# Patient Record
Sex: FEMALE | Race: WHITE
[De-identification: names, ages, dates, MRNs, and addresses within clinical notes are randomized per-mention and may not be internally consistent; named-entity substitution may affect disease eponyms.]

---

## 2017-02-10 ENCOUNTER — HOSPITAL ENCOUNTER (OUTPATIENT)
Dept: HOSPITAL 47 - LABPAT | Age: 82
Discharge: HOME | End: 2017-02-10
Payer: MEDICARE

## 2017-02-10 DIAGNOSIS — Z01.812: Primary | ICD-10-CM

## 2017-02-10 DIAGNOSIS — Z01.810: ICD-10-CM

## 2017-02-10 LAB
ANION GAP SERPL CALC-SCNC: 10 MMOL/L
CELLS COUNTED: 100
CH: 31.9
CHCM: 33
CHLORIDE SERPL-SCNC: 100 MMOL/L (ref 98–107)
CO2 SERPL-SCNC: 30 MMOL/L (ref 22–30)
EKG: (no result)
ERYTHROCYTE [DISTWIDTH] IN BLOOD BY AUTOMATED COUNT: 4.23 M/UL (ref 3.8–5.4)
ERYTHROCYTE [DISTWIDTH] IN BLOOD: 13.8 % (ref 11.5–15.5)
HCT VFR BLD AUTO: 41 % (ref 34–46)
HDW: 2.43
HGB BLD-MCNC: 13.4 GM/DL (ref 11.4–16)
MCH RBC QN AUTO: 31.6 PG (ref 25–35)
MCHC RBC AUTO-ENTMCNC: 32.6 G/DL (ref 31–37)
MCV RBC AUTO: 96.9 FL (ref 80–100)
POTASSIUM SERPL-SCNC: 5.1 MMOL/L (ref 3.5–5.1)
SODIUM SERPL-SCNC: 140 MMOL/L (ref 137–145)
WBC # BLD AUTO: 5.9 K/UL (ref 3.8–10.6)
WBC (PEROX): 9.65

## 2017-02-10 PROCEDURE — 85025 COMPLETE CBC W/AUTO DIFF WBC: CPT

## 2017-02-10 PROCEDURE — 86900 BLOOD TYPING SEROLOGIC ABO: CPT

## 2017-02-10 PROCEDURE — 93005 ELECTROCARDIOGRAM TRACING: CPT

## 2017-02-10 PROCEDURE — 80051 ELECTROLYTE PANEL: CPT

## 2017-02-10 PROCEDURE — 86850 RBC ANTIBODY SCREEN: CPT

## 2017-02-10 PROCEDURE — 86901 BLOOD TYPING SEROLOGIC RH(D): CPT

## 2017-02-17 ENCOUNTER — HOSPITAL ENCOUNTER (INPATIENT)
Dept: HOSPITAL 47 - 2ORWHC | Age: 82
LOS: 5 days | Discharge: HOME HEALTH | DRG: 330 | End: 2017-02-22
Payer: MEDICARE

## 2017-02-17 VITALS — BODY MASS INDEX: 24.7 KG/M2

## 2017-02-17 DIAGNOSIS — Z87.891: ICD-10-CM

## 2017-02-17 DIAGNOSIS — N73.6: ICD-10-CM

## 2017-02-17 DIAGNOSIS — M15.9: ICD-10-CM

## 2017-02-17 DIAGNOSIS — N32.1: ICD-10-CM

## 2017-02-17 DIAGNOSIS — E88.09: ICD-10-CM

## 2017-02-17 DIAGNOSIS — J90: ICD-10-CM

## 2017-02-17 DIAGNOSIS — K57.20: Primary | ICD-10-CM

## 2017-02-17 DIAGNOSIS — Z87.440: ICD-10-CM

## 2017-02-17 DIAGNOSIS — B96.20: ICD-10-CM

## 2017-02-17 DIAGNOSIS — B37.49: ICD-10-CM

## 2017-02-17 DIAGNOSIS — I10: ICD-10-CM

## 2017-02-17 DIAGNOSIS — M19.91: ICD-10-CM

## 2017-02-17 DIAGNOSIS — R32: ICD-10-CM

## 2017-02-17 PROCEDURE — 87086 URINE CULTURE/COLONY COUNT: CPT

## 2017-02-17 PROCEDURE — 86850 RBC ANTIBODY SCREEN: CPT

## 2017-02-17 PROCEDURE — 80053 COMPREHEN METABOLIC PANEL: CPT

## 2017-02-17 PROCEDURE — 87070 CULTURE OTHR SPECIMN AEROBIC: CPT

## 2017-02-17 PROCEDURE — 80048 BASIC METABOLIC PNL TOTAL CA: CPT

## 2017-02-17 PROCEDURE — 87077 CULTURE AEROBIC IDENTIFY: CPT

## 2017-02-17 PROCEDURE — 86901 BLOOD TYPING SEROLOGIC RH(D): CPT

## 2017-02-17 PROCEDURE — 84100 ASSAY OF PHOSPHORUS: CPT

## 2017-02-17 PROCEDURE — 85025 COMPLETE CBC W/AUTO DIFF WBC: CPT

## 2017-02-17 PROCEDURE — 86900 BLOOD TYPING SEROLOGIC ABO: CPT

## 2017-02-17 PROCEDURE — 84132 ASSAY OF SERUM POTASSIUM: CPT

## 2017-02-17 PROCEDURE — 87186 SC STD MICRODIL/AGAR DIL: CPT

## 2017-02-17 PROCEDURE — 87040 BLOOD CULTURE FOR BACTERIA: CPT

## 2017-02-17 PROCEDURE — 0DBN4ZZ EXCISION OF SIGMOID COLON, PERCUTANEOUS ENDOSCOPIC APPROACH: ICD-10-PCS

## 2017-02-17 PROCEDURE — 87205 SMEAR GRAM STAIN: CPT

## 2017-02-17 PROCEDURE — 88307 TISSUE EXAM BY PATHOLOGIST: CPT

## 2017-02-17 PROCEDURE — 71020: CPT

## 2017-02-17 PROCEDURE — 87075 CULTR BACTERIA EXCEPT BLOOD: CPT

## 2017-02-17 PROCEDURE — 83735 ASSAY OF MAGNESIUM: CPT

## 2017-02-17 PROCEDURE — 81001 URINALYSIS AUTO W/SCOPE: CPT

## 2017-02-17 PROCEDURE — 0T788DZ DILATION OF BILATERAL URETERS WITH INTRALUMINAL DEVICE, VIA NATURAL OR ARTIFICIAL OPENING ENDOSCOPIC: ICD-10-PCS

## 2017-02-17 RX ADMIN — HYDROMORPHONE HYDROCHLORIDE PRN MG: 1 INJECTION, SOLUTION INTRAMUSCULAR; INTRAVENOUS; SUBCUTANEOUS at 12:43

## 2017-02-17 RX ADMIN — ONDANSETRON ONE MG: 2 INJECTION INTRAMUSCULAR; INTRAVENOUS at 12:22

## 2017-02-17 RX ADMIN — HYDROMORPHONE HYDROCHLORIDE PRN MG: 1 INJECTION, SOLUTION INTRAMUSCULAR; INTRAVENOUS; SUBCUTANEOUS at 12:54

## 2017-02-17 RX ADMIN — DEXTROSE MONOHYDRATE, SODIUM CHLORIDE, AND POTASSIUM CHLORIDE SCH MLS/HR: 50; 4.5; 1.49 INJECTION, SOLUTION INTRAVENOUS at 14:06

## 2017-02-17 RX ADMIN — METRONIDAZOLE SCH MLS/HR: 500 INJECTION, SOLUTION INTRAVENOUS at 16:12

## 2017-02-17 RX ADMIN — HYDROMORPHONE HYDROCHLORIDE PRN MG: 1 INJECTION, SOLUTION INTRAMUSCULAR; INTRAVENOUS; SUBCUTANEOUS at 12:22

## 2017-02-17 RX ADMIN — HEPARIN SODIUM SCH: 5000 INJECTION, SOLUTION INTRAVENOUS; SUBCUTANEOUS at 15:04

## 2017-02-17 RX ADMIN — LABETALOL HCL SCH MG: 100 TABLET, FILM COATED ORAL at 21:01

## 2017-02-17 RX ADMIN — ONDANSETRON ONE MG: 2 INJECTION INTRAMUSCULAR; INTRAVENOUS at 06:14

## 2017-02-17 RX ADMIN — DEXTROSE MONOHYDRATE, SODIUM CHLORIDE, AND POTASSIUM CHLORIDE SCH: 50; 4.5; 1.49 INJECTION, SOLUTION INTRAVENOUS at 20:00

## 2017-02-17 RX ADMIN — HYDROMORPHONE HYDROCHLORIDE PRN MG: 1 INJECTION, SOLUTION INTRAMUSCULAR; INTRAVENOUS; SUBCUTANEOUS at 12:32

## 2017-02-17 RX ADMIN — POTASSIUM CHLORIDE SCH MLS: 14.9 INJECTION, SOLUTION INTRAVENOUS at 06:10

## 2017-02-17 NOTE — P.PCN
Date of Procedure: 02/17/17


Preoperative Diagnosis: 


Colovesical fistula, complex perforated diverticulitis


Postoperative Diagnosis: 


Saline


Procedure(s) Performed: 


Laparoscopic mobilization of splenic flexure, laparoscopic takedown and repair 

of colovesical fistula, laparoscopic sigmoid colectomy with primary colorectal 

anastomosis, placement of round #19 drain, intraoperative colonoscopy for 

flexible sigmoidoscopy


Anesthesia: GETA, local


Surgeon: Kalli Roberson


Estimated Blood Loss (ml): 75


Pathology: other (Sigmoid colon, donuts)


Condition: stable


Disposition: floor


Operative Findings: 


Colovesical fistula along the left lateral bladder dome resected with stapler, 

retained abscess from previous sigmoid diverticulitis with perforation, sigmoid 

stricture from recurrent diverticulitis resected, high splenic flexure 

mobilized laparoscopically, 29 mm ILS for colorectal anastomosis, negative leak 

test, REMEDIOS placed anterior to colorectal anastomosis via right lower quadrant

## 2017-02-17 NOTE — OP
DATE OF SERVICE:  



SURGEON:  EMI UREÑA MD

PREOPERATIVE DIAGNOSIS:  Colovesical fistula. 

POSTOPERATIVE DIAGNOSIS: Colovesical fistula. 

OPERATION:       Cystoscopy with placement of bilateral lighted 

ureteral stents. 

ANESTHESIA:  General anesthesia. 





INDICATIONS:  The patient is an 84-year-old female with a history of 

recurrent urinary tract infections who has begun passing particular 

matter and air in her urine.  The patient has been discovered to have a 
colovesical 

fistula related to diverticulitis. Dr. Roberson plans resection of 

the diseased portion of her colon and placement of bilateral ureteral 

stents was requested to aid in intraoperative localization of the 

ureters.  



PROCEDURE: The patient was taken the operating suite where adequate 

general anesthesia via orotracheal intubation was instituted. The 

patient was placed in the dorsal lithotomy position with her legs 

suspended from padded Dagoberto stirrups. Pneumatic compression stockings 

were applied to the lower legs. The genitalia was prepped with 

Betadine solution, and draped in a sterile fashion.  



The external genitalia and urethral meatus were normal. The 22 Uzbek 

cystoscope sheath with 30 degrees lens was passed through the urethra 

and into the bladder. The bladder contained cloudy urine with 

particulate matter. This was irrigated out using sterile water. The 

bladder was examined. Both ureteral orifices were of normal location 

and configuration and effluxed clear urine. There was some slight 

edema on the posterior bladder wall which would most likely correspond 

to the fistula site. The remainder of the bladder was free of tumor, 

foreign body and diverticulum.  



A 5 Uzbek lighted fibrotic ureteral stent was then passed through the 

right ureteral orifice and advanced up to the region of the renal 

pelvis. The same size stent was then passed through the left ureteral 

orifice and up to the region of the left renal pelvis. Both stents 

passed freely. The cystoscope was removed leaving the stents in place. 

 16 Uzbek Aguilar catheter was inserted and was left to gravity 

drainage. The stents were then secured to the Aguilar catheter.  



The patient tolerated procedure well. The remainder of the procedure 

will be performed by Dr. Roberson and the summary will be in her 

operative note.  It is anticipated that the lighted stents will be 

removed at the completion of the procedure. 



[facility]

## 2017-02-17 NOTE — P.PN
Subjective


Principal diagnosis: 





Colovesical fistula





The patient is postop day 0 status post takedown of colovesical fistula with a 

laparoscopic sigmoid resection and bilateral ureteral stent placement.  She is 

doing well.  Pain is controlled.  She is tolerating water.  No reports of 

flatus.





Objective





- Vital Signs


Vital signs: 


 Vital Signs











Temp  97.9 F   02/17/17 16:24


 


Pulse  99   02/17/17 16:11


 


Resp  12   02/17/17 16:11


 


BP  102/67   02/17/17 16:11


 


Pulse Ox  99   02/17/17 16:11








 Intake & Output











 02/17/17 02/17/17 02/18/17





 06:59 18:59 06:59


 


Intake Total 200 2800 


 


Output Total  520 


 


Balance 200 2280 


 


Intake:   


 


   2800 


 


Output:   


 


  Drainage  45 


 


    Right Abdomen  45 


 


  Urine  400 


 


  Estimated Blood Loss  75 


 


Other:   


 


  Voiding Method  Indwelling Catheter 














- Exam





GENERAL:  Well developed and in no acute distress. Pleasant.


HEENT:  No sclera icterus. Extraocular movements grossly intact.  Moist buccal 

mucosa. Head is atraumatic, normocephalic. Hears conversational speech. No 

nasal drainage.


NECK:  Supple without lymphadenopathy. No JV distention.


CHEST:  Non-labored respirations and equal bilateral excursions. 


CARDIOVASCULAR:  Regular rate and rhythm.  Palpable 2+ radial pulses.


ABDOMEN:  Soft, nondistended.  Dressing clean dry and intact.  REMEDIOS 

serosanguineous.


GENITOURINARY: Urine tea colored.  Moderate reduction of hematuria.


MUSCULOSKELETAL:  No clubbing, cyanosis or edema.


NEUROLOGIC:  No focal or lateralizing signs. 


PSYCH:  Appropriate affect.  Alert and oriented to person, place and time.








- Labs


CBC & Chem 7: 


 02/17/17 06:10


Labs: 


 Microbiology - Last 24 Hours (Table)











 02/17/17 10:50 Body Fluid Culture - Preliminary





 Aspirate 


 


 02/17/17 10:50 Anaerobic Culture - Preliminary





 Aspirate 














Assessment and Plan


(1) Colovesical fistula


Status: Chronic   





(2) Perforation of sigmoid colon due to diverticulitis


Status: Chronic   





(3) Status post colectomy


Status: Acute   





(4) S/P bladder repair


Status: Acute   


Plan: 





1.  Continue Aguilar catheter for at least 72 hours for recent bladder repair.


2.  IV fluid hydration.


3.  DVT prophylaxis.


4.  Ambulation 4 times daily.


5.  Continue enhance colon Recovery with exception of continue Aguilar catheter.


6.  Disposition from hospital pending flatus.

## 2017-02-17 NOTE — P.GSHP
History of Present Illness


H&P Date: 02/17/17











CHIEF COMPLAINT:


Colovesical fistula and diverticulitis. 





HISTORY OF PRESENT ILLNESS:


Angie Moer is an 84 years-old female who comes in with a history of air 

within the bladder secondary to diverticulitis and a colovesical fistula. In 

fact, she has already had an evaluation with another surgeon at [facility]. Secondary to her symptoms, she is now seeking care locally. She 

denies any abdominal surgeries, except her tubal ligation and having 10 

children through vaginal delivery. There are no reports of cardiac issues or 

chest pain. She has been recently treated for a recurrent UTI. She now presents 

further evaluation and management. 








PAST MEDICAL HISTORY: 


Please see list





PAST SURGICAL HISTORY: 


Please see list





MEDICATIONS: 


Please see list





ALLERGIES: Denies. 





SOCIAL HISTORY: No illicit drug use or recent tobacco use





FAMILY HISTORY: Pertinent for gallbladder disease 





REVIEW OF ORGAN SYSTEMS: 


GI: History of recurrent diverticulitis. Colonoscopy was done within the last 2 

months, negative for malignancy.


CONSTITUTIONAL: No fevers or chills.


HEENT: Denies any trouble with vision, hearing or nosebleeds. No difficulty 

swallowing. 


LYMPHATIC: The patient denies any lumps and bumps around the neck. 


ENDOCRINE: Denies any thyroid disorders. Denies any blood sugar glucose 

intolerance.


RESPIRATORY: Denies pneumonia. Denies any troubles with breathing or dyspnea on 

exertion. 


CARDIOVASCULAR: Denies any chest pain, palpitations, or recent heart attacks. 


GENITOURINARY: Denies any blood in urine or increased urinary frequency. 


MUSCULOSKELETAL: Denies any back pain, stiffness or joint arthritis. 


NEUROLOGIC: Denies any numbness or tingling along the distal extremities. No 

seizure disorders or headaches.


PSYCHIATRIC: Denies any depression or suicidal ideation.


HEMATOLOGIC: Denies any abnormal bleeding or bruising. 


BREASTS: Denies any breast lumps, pain or nipple discharge.





PHYSICAL EXAM: 


VITAL SIGNS: 


Afebrile vital signs stable





GENERAL: Well developed and in no acute distress. Pleasant.


HEENT: No sclera icterus. Extraocular movements grossly intact. Moist buccal 

mucosa. Head is atraumatic, normocephalic. Hears conversational speech. No 

nasal drainage.


NECK: Supple without lymphadenopathy. No JV distention.


CHEST: Non-labored respirations and equal bilateral excursions. 


CARDIOVASCULAR: Regular rate and rhythm. Palpable 2+ radial pulses.


ABDOMEN: Soft. Non-tender. Nondistended. No large scars. Along the umbilicus, 

umbilical hernia reducible. 


MUSCULOSKELETAL: No clubbing, cyanosis or edema.


NEUROLOGIC: No focal or lateralizing signs. 


PSYCH: Appropriate affect. Alert and oriented to person, place and time.





STUDIES: 


CT of the abdomen/pelvis was reviewed in detail and demonstrated air within the 

bladder including previous history of diverticulitis with an abscess from her 11 /20/16 film. The imaging studies were reviewed along with her at bedside. 





ASSESSMENT:


1. Diverticulitis. 


2. Colovesical fistula. 


3. History of recurrent UTI. 


4. Hypertension. 





PLAN:


1. I have gone over surgical options including minimally invasive techniques of 

laparoscopic and robotic including open surgery. The possibility of ostomy 

creation was also reviewed. Proceed with laparoscopic sigmoid colectomy.


2. More commonly, the risks of surgical site infection were described as this 

does involve the colon. This is also a colovesical fistula with increased risk 

for ureter complications and bladder complications were described. 


3. Referral to the urologist for stent placement including bladder repair was 

also reviewed in detail. 


4. She will need enhanced colon recovery program protocol.


5. Inpatient hospitalization on average of anywhere between 3-7 days pending 

minimally evasive versus open technique. 


6. DVT prophylaxis. 


7. Antibiotics prophylaxis. 








Past Medical History


Past Medical History: Hypertension, Osteoarthritis (OA)


Additional Past Medical History / Comment(s): varicose veins, diverticulitis,


History of Any Multi-Drug Resistant Organisms: None Reported


Past Surgical History: Tubal Ligation


Additional Past Surgical History / Comment(s): colonscopy


Past Anesthesia/Blood Transfusion Reactions: No Reported Reaction


Past Psychological History: No Psychological Hx Reported


Smoking Status: Former smoker


Past Alcohol Use History: Occasional


Additional Past Alcohol Use History / Comment(s): quit smoking age 30's, smoked 

on and off since age 14


Past Drug Use History: None Reported





- Past Family History


  ** Mother


Family Medical History: Cancer





  ** Daughter(s)


Family Medical History: Cancer





Medications and Allergies


 Home Medications











 Medication  Instructions  Recorded  Confirmed  Type


 


Aspirin EC [Ecotrin Low Dose] 81 mg PO DAILY 11/17/16 02/17/17 History


 


Labetalol [Trandate] 100 mg PO HS 11/17/16 02/17/17 History


 


Multivitamins, Thera [Multivitamin] 1 tab PO DAILY 11/17/16 02/17/17 History











 Allergies











Allergy/AdvReac Type Severity Reaction Status Date / Time


 


No Known Allergies Allergy   Verified 02/17/17 06:03

## 2017-02-18 LAB
ALP SERPL-CCNC: 69 U/L (ref 38–126)
ALT SERPL-CCNC: 25 U/L (ref 9–52)
ANION GAP SERPL CALC-SCNC: 9 MMOL/L
AST SERPL-CCNC: 25 U/L (ref 14–36)
BASOPHILS # BLD AUTO: 0 K/UL (ref 0–0.2)
BASOPHILS NFR BLD AUTO: 0 %
BUN SERPL-SCNC: 11 MG/DL (ref 7–17)
CALCIUM SPEC-MCNC: 8.4 MG/DL (ref 8.4–10.2)
CH: 31.9
CHCM: 32.7
CHLORIDE SERPL-SCNC: 99 MMOL/L (ref 98–107)
CO2 SERPL-SCNC: 27 MMOL/L (ref 22–30)
EOSINOPHIL # BLD AUTO: 0 K/UL (ref 0–0.7)
EOSINOPHIL NFR BLD AUTO: 0 %
ERYTHROCYTE [DISTWIDTH] IN BLOOD BY AUTOMATED COUNT: 3.58 M/UL (ref 3.8–5.4)
ERYTHROCYTE [DISTWIDTH] IN BLOOD: 13.9 % (ref 11.5–15.5)
GLUCOSE SERPL-MCNC: 168 MG/DL (ref 74–99)
HCT VFR BLD AUTO: 35.2 % (ref 34–46)
HDW: 2.42
HGB BLD-MCNC: 11.3 GM/DL (ref 11.4–16)
LUC NFR BLD AUTO: 1 %
LYMPHOCYTES # SPEC AUTO: 1 K/UL (ref 1–4.8)
LYMPHOCYTES NFR SPEC AUTO: 7 %
MAGNESIUM SPEC-SCNC: 1.7 MG/DL (ref 1.6–2.3)
MCH RBC QN AUTO: 31.6 PG (ref 25–35)
MCHC RBC AUTO-ENTMCNC: 32.2 G/DL (ref 31–37)
MCV RBC AUTO: 98.2 FL (ref 80–100)
MONOCYTES # BLD AUTO: 0.7 K/UL (ref 0–1)
MONOCYTES NFR BLD AUTO: 5 %
NEUTROPHILS # BLD AUTO: 12 K/UL (ref 1.3–7.7)
NEUTROPHILS NFR BLD AUTO: 87 %
NON-AFRICAN AMERICAN GFR(MDRD): >60
PHOSPHATE SERPL-MCNC: 3.5 MG/DL (ref 2.5–4.5)
POTASSIUM SERPL-SCNC: 5.2 MMOL/L (ref 3.5–5.1)
PROT SERPL-MCNC: 5.8 G/DL (ref 6.3–8.2)
SODIUM SERPL-SCNC: 135 MMOL/L (ref 137–145)
WBC # BLD AUTO: 0.11 10*3/UL
WBC # BLD AUTO: 13.9 K/UL (ref 3.8–10.6)
WBC (PEROX): 14.4

## 2017-02-18 RX ADMIN — HEPARIN SODIUM SCH UNIT: 5000 INJECTION, SOLUTION INTRAVENOUS; SUBCUTANEOUS at 07:50

## 2017-02-18 RX ADMIN — LABETALOL HCL SCH MG: 100 TABLET, FILM COATED ORAL at 20:25

## 2017-02-18 RX ADMIN — HEPARIN SODIUM SCH UNIT: 5000 INJECTION, SOLUTION INTRAVENOUS; SUBCUTANEOUS at 23:09

## 2017-02-18 RX ADMIN — ALVIMOPAN SCH MG: 12 CAPSULE ORAL at 20:25

## 2017-02-18 RX ADMIN — CEFAZOLIN SCH MLS/HR: 330 INJECTION, POWDER, FOR SOLUTION INTRAMUSCULAR; INTRAVENOUS at 11:59

## 2017-02-18 RX ADMIN — HYDROMORPHONE HYDROCHLORIDE PRN MG: 1 INJECTION, SOLUTION INTRAMUSCULAR; INTRAVENOUS; SUBCUTANEOUS at 11:56

## 2017-02-18 RX ADMIN — DEXTROSE MONOHYDRATE, SODIUM CHLORIDE, AND POTASSIUM CHLORIDE SCH MLS/HR: 50; 4.5; 1.49 INJECTION, SOLUTION INTRAVENOUS at 04:00

## 2017-02-18 RX ADMIN — HEPARIN SODIUM SCH UNIT: 5000 INJECTION, SOLUTION INTRAVENOUS; SUBCUTANEOUS at 00:05

## 2017-02-18 RX ADMIN — METRONIDAZOLE SCH MLS/HR: 500 INJECTION, SOLUTION INTRAVENOUS at 16:11

## 2017-02-18 RX ADMIN — METRONIDAZOLE SCH MLS/HR: 500 INJECTION, SOLUTION INTRAVENOUS at 00:05

## 2017-02-18 RX ADMIN — HEPARIN SODIUM SCH UNIT: 5000 INJECTION, SOLUTION INTRAVENOUS; SUBCUTANEOUS at 16:11

## 2017-02-18 RX ADMIN — THERA TABS SCH EACH: TAB at 12:02

## 2017-02-18 RX ADMIN — DEXTROSE MONOHYDRATE, SODIUM CHLORIDE, AND POTASSIUM CHLORIDE SCH MLS/HR: 50; 4.5; 1.49 INJECTION, SOLUTION INTRAVENOUS at 10:31

## 2017-02-18 RX ADMIN — HYDROMORPHONE HYDROCHLORIDE PRN MG: 1 INJECTION, SOLUTION INTRAMUSCULAR; INTRAVENOUS; SUBCUTANEOUS at 20:25

## 2017-02-18 RX ADMIN — HYDROMORPHONE HYDROCHLORIDE PRN MG: 1 INJECTION, SOLUTION INTRAMUSCULAR; INTRAVENOUS; SUBCUTANEOUS at 10:38

## 2017-02-18 RX ADMIN — POTASSIUM CHLORIDE SCH: 14.9 INJECTION, SOLUTION INTRAVENOUS at 06:35

## 2017-02-18 RX ADMIN — ALVIMOPAN SCH MG: 12 CAPSULE ORAL at 07:50

## 2017-02-18 RX ADMIN — ASPIRIN 81 MG CHEWABLE TABLET SCH MG: 81 TABLET CHEWABLE at 07:50

## 2017-02-18 NOTE — P.PN
Subjective


Principal diagnosis: 





Colovesical fistula





The patient is postop day 1 status post takedown of colovesical fistula with a 

laparoscopic sigmoid resection and bilateral ureteral stent placement.  She is 

ambulating with assistance. No reports of nausea or vomiting. No passage of 

flatus. She is urinating well.





Objective





- Vital Signs


Vital signs: 


 Vital Signs











Temp  98.4 F   02/18/17 07:00


 


Pulse  81   02/18/17 07:00


 


Resp  16   02/18/17 07:00


 


BP  114/77   02/18/17 07:00


 


Pulse Ox  99   02/18/17 07:00








 Intake & Output











 02/17/17 02/18/17 02/18/17





 18:59 06:59 18:59


 


Intake Total 2800 1675 


 


Output Total 520 530 


 


Balance 2280 1145 


 


Intake:   


 


  IV 2800  


 


  Intake, IV Titration  1675 





  Amount   


 


    D5-0.45% NaCl with KCl  1375 





    20Meq/l 1,000 ml @ 125   





    mls/hr IV .Q8H CLOTILDE Rx#:   





    [id number]   


 


    ceFAZolin 2 gm In Sodium  200 





    Chloride 0.9% 100 ml @   





    100 mls/hr IVPB Q8HR CLOTILDE   





    Rx#:[id number]   


 


    metroNIDAZOLE-NS   100 





    mg In Saline 1 100ml.bag   





    @ 100 mls/hr IVPB Q8HR   





    CLOTILDE Rx#:[id number]   


 


Output:   


 


  Drainage 45 30 


 


    Right Abdomen 45 30 


 


  Urine 400 500 


 


  Estimated Blood Loss 75  


 


Other:   


 


  Voiding Method Indwelling Catheter Indwelling Catheter Indwelling Catheter


 


  # Bowel Movements  0 














- Exam





GENERAL:  Well developed and in no acute distress. Pleasant.


HEENT:  No sclera icterus. Extraocular movements grossly intact.  Moist buccal 

mucosa. Head is atraumatic, normocephalic. Hears conversational speech. No 

nasal drainage.


NECK:  Supple without lymphadenopathy. No JV distention.


CHEST:  Non-labored respirations and equal bilateral excursions. 


CARDIOVASCULAR:  Regular rate and rhythm.  Palpable 2+ radial pulses.


ABDOMEN:  Soft, mildly distended.  Dressing clean dry and intact.  REMEDIOS 

serosanguineous. Minimal lynnette-incisional tenderness.


GENITOURINARY: Urine tea colored. No gross hematuria.


MUSCULOSKELETAL:  No clubbing, cyanosis or edema.


NEUROLOGIC:  No focal or lateralizing signs. 


PSYCH:  Appropriate affect.  Alert and oriented to person, place and time.








- Labs


CBC & Chem 7: 


 02/18/17 07:27





 02/18/17 07:27


Labs: 


 Abnormal Lab Results - Last 24 Hours (Table)











  02/18/17 02/18/17 Range/Units





  07:27 07:27 


 


WBC  13.9 H   (3.8-10.6)  k/uL


 


RBC  3.58 L   (3.80-5.40)  m/uL


 


Hgb  11.3 L   (11.4-16.0)  gm/dL


 


Neutrophils #  12.0 H   (1.3-7.7)  k/uL


 


Sodium   135 L  (137-145)  mmol/L


 


Potassium   5.2 H  (3.5-5.1)  mmol/L


 


Glucose   168 H  (74-99)  mg/dL


 


Total Protein   5.8 L  (6.3-8.2)  g/dL


 


Albumin   3.0 L  (3.5-5.0)  g/dL








 Microbiology - Last 24 Hours (Table)











 02/17/17 10:50 Gram Stain - Preliminary





 Aspirate Body Fluid Culture - Preliminary





    Gram Neg Bacilli


 


 02/17/17 10:50 Anaerobic Culture - Preliminary





 Aspirate 














Assessment and Plan


(1) Colovesical fistula


Status: Chronic   





(2) Perforation of sigmoid colon due to diverticulitis


Status: Chronic   





(3) Status post colectomy


Status: Acute   





(4) S/P bladder repair


Status: Acute   


Plan: 





1.  Continue blanco catheter.


2.  IV fluids adjusted to normal saline.


3.  Home healthcare evaluation.


4.  Continue liquid diet.


5.  Continue hospitalization.


6.  Continue IV antibiotics for contaminated case.

## 2017-02-19 LAB
ALP SERPL-CCNC: 79 U/L (ref 38–126)
ALT SERPL-CCNC: 31 U/L (ref 9–52)
ANION GAP SERPL CALC-SCNC: 8 MMOL/L
AST SERPL-CCNC: 23 U/L (ref 14–36)
BASOPHILS # BLD AUTO: 0.2 K/UL (ref 0–0.2)
BASOPHILS NFR BLD AUTO: 1 %
BUN SERPL-SCNC: 12 MG/DL (ref 7–17)
CALCIUM SPEC-MCNC: 8.3 MG/DL (ref 8.4–10.2)
CH: 31.8
CHCM: 32.3
CHLORIDE SERPL-SCNC: 98 MMOL/L (ref 98–107)
CO2 SERPL-SCNC: 28 MMOL/L (ref 22–30)
EOSINOPHIL # BLD AUTO: 0 K/UL (ref 0–0.7)
EOSINOPHIL NFR BLD AUTO: 0 %
ERYTHROCYTE [DISTWIDTH] IN BLOOD BY AUTOMATED COUNT: 3.5 M/UL (ref 3.8–5.4)
ERYTHROCYTE [DISTWIDTH] IN BLOOD: 14 % (ref 11.5–15.5)
GLUCOSE SERPL-MCNC: 141 MG/DL (ref 74–99)
HCT VFR BLD AUTO: 34.6 % (ref 34–46)
HDW: 2.45
HGB BLD-MCNC: 10.8 GM/DL (ref 11.4–16)
LUC NFR BLD AUTO: 1 %
LYMPHOCYTES # SPEC AUTO: 1.2 K/UL (ref 1–4.8)
LYMPHOCYTES NFR SPEC AUTO: 7 %
MAGNESIUM SPEC-SCNC: 1.7 MG/DL (ref 1.6–2.3)
MCH RBC QN AUTO: 30.8 PG (ref 25–35)
MCHC RBC AUTO-ENTMCNC: 31.2 G/DL (ref 31–37)
MCV RBC AUTO: 98.9 FL (ref 80–100)
MONOCYTES # BLD AUTO: 0.6 K/UL (ref 0–1)
MONOCYTES NFR BLD AUTO: 4 %
NEUTROPHILS # BLD AUTO: 14.4 K/UL (ref 1.3–7.7)
NEUTROPHILS NFR BLD AUTO: 88 %
NON-AFRICAN AMERICAN GFR(MDRD): >60
PARTICLE COUNT: (no result)
PH UR: 5.5 [PH] (ref 5–8)
POTASSIUM SERPL-SCNC: 4.7 MMOL/L (ref 3.5–5.1)
PROT SERPL-MCNC: 5.7 G/DL (ref 6.3–8.2)
PROT UR QL: (no result)
RBC UR QL: >182 /HPF (ref 0–5)
SODIUM SERPL-SCNC: 134 MMOL/L (ref 137–145)
SP GR UR: 1.02 (ref 1–1.03)
SQUAMOUS UR QL AUTO: 2 /HPF (ref 0–4)
UA BILLING (MACRO VS. MICRO): (no result)
UROBILINOGEN UR QL STRIP: <2 MG/DL (ref ?–2)
WBC # BLD AUTO: 0.14 10*3/UL
WBC # BLD AUTO: 16.4 K/UL (ref 3.8–10.6)
WBC #/AREA URNS HPF: 105 /HPF (ref 0–5)
WBC (PEROX): 17.35

## 2017-02-19 RX ADMIN — ALVIMOPAN SCH MG: 12 CAPSULE ORAL at 07:56

## 2017-02-19 RX ADMIN — CEFAZOLIN SCH MLS/HR: 330 INJECTION, POWDER, FOR SOLUTION INTRAMUSCULAR; INTRAVENOUS at 20:01

## 2017-02-19 RX ADMIN — HEPARIN SODIUM SCH UNIT: 5000 INJECTION, SOLUTION INTRAVENOUS; SUBCUTANEOUS at 07:55

## 2017-02-19 RX ADMIN — CEFAZOLIN SCH: 330 INJECTION, POWDER, FOR SOLUTION INTRAMUSCULAR; INTRAVENOUS at 03:11

## 2017-02-19 RX ADMIN — METRONIDAZOLE SCH MLS/HR: 500 INJECTION, SOLUTION INTRAVENOUS at 08:55

## 2017-02-19 RX ADMIN — ALVIMOPAN SCH MG: 12 CAPSULE ORAL at 20:01

## 2017-02-19 RX ADMIN — METRONIDAZOLE SCH MLS/HR: 500 INJECTION, SOLUTION INTRAVENOUS at 00:14

## 2017-02-19 RX ADMIN — CEFAZOLIN SCH MLS/HR: 330 INJECTION, POWDER, FOR SOLUTION INTRAMUSCULAR; INTRAVENOUS at 09:00

## 2017-02-19 RX ADMIN — AMPICILLIN SODIUM AND SULBACTAM SODIUM SCH MLS/HR: 2; 1 INJECTION, POWDER, FOR SOLUTION INTRAMUSCULAR; INTRAVENOUS at 23:47

## 2017-02-19 RX ADMIN — HEPARIN SODIUM SCH UNIT: 5000 INJECTION, SOLUTION INTRAVENOUS; SUBCUTANEOUS at 16:05

## 2017-02-19 RX ADMIN — AMPICILLIN SODIUM AND SULBACTAM SODIUM SCH MLS/HR: 2; 1 INJECTION, POWDER, FOR SOLUTION INTRAMUSCULAR; INTRAVENOUS at 17:19

## 2017-02-19 RX ADMIN — LABETALOL HCL SCH MG: 100 TABLET, FILM COATED ORAL at 20:01

## 2017-02-19 RX ADMIN — THERA TABS SCH: TAB at 11:48

## 2017-02-19 RX ADMIN — ASPIRIN 81 MG CHEWABLE TABLET SCH MG: 81 TABLET CHEWABLE at 07:56

## 2017-02-19 RX ADMIN — METRONIDAZOLE SCH MLS/HR: 500 INJECTION, SOLUTION INTRAVENOUS at 16:04

## 2017-02-19 RX ADMIN — METOCLOPRAMIDE SCH MG: 5 INJECTION, SOLUTION INTRAMUSCULAR; INTRAVENOUS at 15:12

## 2017-02-19 RX ADMIN — METOCLOPRAMIDE SCH MG: 5 INJECTION, SOLUTION INTRAMUSCULAR; INTRAVENOUS at 23:47

## 2017-02-19 RX ADMIN — HYDROMORPHONE HYDROCHLORIDE PRN MG: 1 INJECTION, SOLUTION INTRAMUSCULAR; INTRAVENOUS; SUBCUTANEOUS at 02:21

## 2017-02-19 RX ADMIN — METOCLOPRAMIDE SCH: 5 INJECTION, SOLUTION INTRAMUSCULAR; INTRAVENOUS at 17:32

## 2017-02-19 NOTE — XR
EXAMINATION TYPE: XR chest 2V

 

DATE OF EXAM: 2/19/2017 9:28 AM

 

COMPARISON: CT angiogram of the chest 22nd of February 2016

 

HISTORY: Atelectasis

 

TECHNIQUE:  Frontal and lateral views of the chest are obtained.

 

FINDINGS:  The heart remains enlarged. Pleural effusion on the right has resolved. There is some ques
tionable minimal posterior blunting of the costophrenic angles. Emphysematous changes are suspected, 
lung volumes are prominent. No pneumothorax. Patient is rotated. Pulmonary vascularity and yoni withi
n normal limits. There is lateralization of the left hemidiaphragm.

 

IMPRESSION:  Improvement in pleural effusion. Cardiomegaly. Follow-up PA and lateral chest x-ray as i
ndicated. Rotated exam.

## 2017-02-19 NOTE — CONS
DATE OF CONSULTATION:  02/19/2017



Reason for consultation is abnormal abscess, E. coli. 



HISTORY OF PRESENT ILLNESS: The patient is an 84-year-old  

female with past medical history significant for diverticulitis with 

complication of  colovesical fistula.  The patient did have a 

CAT scan, which did show air within the bladder secondary to her 

diverticulitis.  Subsequently, the patient has been evaluated and the 

patient has been electively admitted to the hospital for  repair 

of the same.  The patient was taken to the OR on 2/17/2017. 

 The patient is status post takedown of the 

colovesical fistula with ascorbic sigmoid resection and bilateral 

ureteral stent placement. She was noticed to pus that has been 

drained. Cultures were obtained which is now showing E. coli.  Patient 

has been treated with cefazolin and Flagyl. ID was consulted today for 

further recommendation regarding antibiotic therapy.  Patient denies 

significant abdominal pain.  At this point, her pain is currently 

controlled with pain medication.  Patient denies having any nausea or 

vomiting. She has been tolerating a clear liquid diet, has been 

passing some gas but did not have any bowel movement. Patient denies 

having any chest pain or shortness of breath or cough.  Patient did 

mention taking a course of oral antibiotic for 2 weeks prior to being 

admitted to the hospital; however, the patient is not sure about the 

name of the antibiotics.   



REVIEW OF SYSTEMS: 

CONSTITUTIONAL: Positive for weakness. No high-grade fever. 

EYES: No complaint. 

ENT: No complaint. 

RESPIRATORY: No complaint. 

CARDIOVASCULAR: No complaint. 

GENITOURINARY: As per HPI.

GASTROINTESTINAL:  As per HPI.  

MUSCULOSKELETAL: No complaint. 

INTEGUMENTARY: No complaint. 

PSYCHOLOGICAL:  No complaint.

ENDOCRINE: No complaint. 

HEMATOLOGICAL:  No complaint. 



PAST MEDICAL HISTORY: Hypertension, osteoarthritis, diverticulitis, 

varicose veins and UTI.   



PAST SURGICAL HISTORY: Tubal ligation and colonoscopy. 



SOCIAL HISTORY: Remote history of smoking; quit back in the 30s. No 

drinking or drug use.  



FAMILY HISTORY: Mother and daughter with a cancer. 



ALLERGIES: No known drug allergies. 



Medications currently include the patient is on aspirin, Cepacol 

lozenges, cefazolin 2 gm, Dilaudid, Trandate, Flagyl, Theragran. 

 



On examination, blood pressure is 133/72 with a pulse of 95, 

temperature of 98 and no fever recorded with this hospital stay. She 

is 94% on 2 L nasal cannula.  

General description is an elderly female, lying in bed in no distress. 

No tachypnea or accessory muscles for respiration use.  

HEENT examination, slight pallor, no scleral icterus. Oral mucosa is 

dry.  

NECK: Trachea central. There is no thyromegaly. 

LUNGS: Unlabored breathing. Clear to auscultation anteriorly. No 

wheeze or crackle.  

HEART: S1, S2. Regular rate and rhythm. 

ABDOMEN: Soft, mildly distended. No guarding and no rigidity REMEDIOS 

drainage with serosaginous  secretion.  

EXTREMITIES: No edema of the feet. 

Examination of Skin : no rash and  no mass palpable. 

NEUROLOGICAL: Patient is awake, alert, oriented x3. Mood and affect 

normal.  



LABS: Hemoglobin is 10.8, white count 16.4. Admission white count is 

13.9 with a BUN of 12, creatinine 0.77. She also has a positive UA 

with moderate LE, WBC.  Abdominal fluid culture is showing E. 

coli, anaerobic cultures pending. Blood culture is not obtained.  



DIAGNOSTIC IMPRESSION AND PLAN: Patient with abdominal abscess from a 

ruptured diverticulitis and patient who did have complication with 

development of colovesical fistula, status post take down of the fistula, 

sigmoid colectomy and end-to-end anastomosis, bilateral ureteral stent 

and placement.  Currently, the patient is afebrile, in no distress. 

improvement of  white compared to yesterday; however, the patient does not look 

toxic any no significant problems .   



PLAN: 

1. We will discontinue the cefazolin. 

2.  Blood cultures x 2.

3.  Will start the patient on Unasyn.

4. Continue Flagyl while we awaiting for the culture to 

finalize.  

5. Will follow up on the clinical condition and cultures to further 

adjust the medication if needed. 



Thank you for this consultation. Will follow this patient along with 

you.  



MIGUEL

## 2017-02-19 NOTE — P.PN
Subjective


Principal diagnosis: 





Colovesical fistula





The patient is postop day 2 status post takedown of colovesical fistula with a 

laparoscopic sigmoid resection and bilateral ureteral stent placement.  She 

denies any acute complaints this morning.  No reports of fevers or chills.  No 

reports of vomiting.  She reports abdominal bloating.  No passage of flatus.  

No reports of increased abdominal pain.  No reports of bowel movement.  Her 

family and friends are at bedside.  Overall, she is in good spirits.





Objective





- Vital Signs


Vital signs: 


 Vital Signs











Temp  98.0 F   02/19/17 07:00


 


Pulse  95   02/19/17 07:00


 


Resp  16   02/19/17 07:00


 


BP  133/72   02/19/17 07:00


 


Pulse Ox  94 L  02/19/17 07:00








 Intake & Output











 02/18/17 02/19/17 02/19/17





 18:59 06:59 18:59


 


Intake Total  200 1600


 


Output Total 30 10 110


 


Balance -30 190 1490


 


Intake:   


 


  IV  100 100


 


    ceFAZolin 2 gm In Sodium  100 100





    Chloride 0.9% 100 ml @   





    100 mls/hr IVPB Q8HR [facility]   





    Rx#:[id number]   


 


  Intake, IV Titration  100 1500





  Amount   


 


    Levofloxacin 500Mg-D5w   100





    Pmx 500 mg In Dextrose/   





    Water 1 100ml.bag @ 100   





    mls/hr IVPB Q24HR [facility] Rx#   





    :[id number]   


 


    Sodium Chloride 0.9% 1,   800





    000 ml @ 100 mls/hr IV .   





    Q10H [facility] Rx#:[id number]   


 


    Sodium Chloride 0.9% 500   500





    ml @ 999 mls/hr IV .Q31M   





    [facility] Rx#:[id number]   


 


    metroNIDAZOLE-NS   100 100





    mg In Saline 1 100ml.bag   





    @ 100 mls/hr IVPB Q8HR   





    [facility] Rx#:[id number]   


 


Output:   


 


  Drainage 30 10 110


 


    Right Abdomen 30 10 110


 


Other:   


 


  Voiding Method Indwelling Catheter Indwelling Catheter Indwelling Catheter


 


  # Bowel Movements  0 














- Exam





GENERAL:  Well developed and in no acute distress. Pleasant.


HEENT:  No sclera icterus. Extraocular movements grossly intact.  Moist buccal 

mucosa. Head is atraumatic, normocephalic. Hears conversational speech. No 

nasal drainage.


NECK:  Supple without lymphadenopathy. No JV distention.


CHEST:  Non-labored respirations and equal bilateral excursions. 


CARDIOVASCULAR:  Regular rate and rhythm.  Palpable 2+ radial pulses.


ABDOMEN:  Soft, increased abdominal distention.  No peritonitis.  REMEDIOS draining 

serosanguineous.  Minimal lynnette-incisional tenderness.  


GENITOURINARY: Dark urine.  No gross hematuria.


MUSCULOSKELETAL:  No clubbing, cyanosis or edema.


NEUROLOGIC:  No focal or lateralizing signs. 


PSYCH:  Appropriate affect.  Alert and oriented to person, place and time.








- Labs


CBC & Chem 7: 


 02/19/17 07:19





 02/19/17 07:19


Labs: 


 Abnormal Lab Results - Last 24 Hours (Table)











  02/19/17 02/19/17 02/19/17 Range/Units





  07:19 07:19 10:00 


 


WBC  16.4 H    (3.8-10.6)  k/uL


 


RBC  3.50 L    (3.80-5.40)  m/uL


 


Hgb  10.8 L    (11.4-16.0)  gm/dL


 


Neutrophils #  14.4 H    (1.3-7.7)  k/uL


 


Sodium   134 L   (137-145)  mmol/L


 


Glucose   141 H   (74-99)  mg/dL


 


Calcium   8.3 L   (8.4-10.2)  mg/dL


 


Total Protein   5.7 L   (6.3-8.2)  g/dL


 


Albumin   2.9 L   (3.5-5.0)  g/dL


 


Urine Appearance    Turbid H  (Clear)  


 


Urine Protein    2+ H  (Negative)  


 


Urine Ketones    Trace H  (Negative)  


 


Urine Blood    Large H  (Negative)  


 


Ur Leukocyte Esterase    Moderate H  (Negative)  


 


Urine RBC    >182 H  (0-5)  /hpf


 


Urine WBC    105 H  (0-5)  /hpf


 


Urine WBC Clumps    Moderate H  (None)  /hpf


 


Hyaline Casts    9 H  (0-2)  /lpf








 Microbiology - Last 24 Hours (Table)











 02/17/17 10:50 Gram Stain - Final





 Aspirate Body Fluid Culture - Final





    Escherichia coli














- Imaging and Cardiology


Chest x-ray: report reviewed, image reviewed (Pre-existing pleural effusion 

improved)





Assessment and Plan


(1) Colovesical fistula


Status: Chronic   





(2) Perforation of sigmoid colon due to diverticulitis


Status: Chronic   





(3) Status post colectomy


Status: Acute   





(4) S/P bladder repair


Status: Acute   





(5) Urinary tract infection


Status: Chronic   





(6) Pleural effusion


Status: Chronic   





(7) E coli infection


Status: Acute   


Plan: 





1.  Her WBC count had increased and additional workup with chest x-ray and 

urine culture was obtained.


2.  She has history of pre-existing chronic urinary tract infection from 

colovesical fistula as a result of complicated perforated diverticulitis since 

November 2016.  Urine culture is pending.


3.  The results of her chest x-ray demonstrates persistent pleural effusion 

which had been pre-existent since November 2016.  Medicine consultation has 

also been obtained.


4.  Her microbiology cultures from her peritoneal fluid has grown E. coli 

resistant to levofloxacin including ciprofloxacin.  Infectious disease 

consultation has been obtained.  Levofloxacin has been discontinued.  

Adjustment of antibiotics to be made.


5.  On exam, she has mild abdominal distention consistent with expected ileus.  

She is now nothing by mouth with exception of ice chips.  Reglan is now 

scheduled with Entereg to be continued.


6.  Overall, clinically she is stable.  Additional consultants obtained.


7.  Disposition pending resolution of leukocytosis, adjustment of antibiotics, 

resolution of ileus.


8.  Potential discharge to rehab pending further evaluation by occupational 

including physical therapy.


9.  Recommend Lasix for forced diuresis to address previous hematuria from 

ureteral stents.

## 2017-02-20 LAB
BASOPHILS # BLD AUTO: 0 K/UL (ref 0–0.2)
BASOPHILS NFR BLD AUTO: 0 %
CH: 31.6
CHCM: 32.5
EOSINOPHIL # BLD AUTO: 0 K/UL (ref 0–0.7)
EOSINOPHIL NFR BLD AUTO: 0 %
ERYTHROCYTE [DISTWIDTH] IN BLOOD BY AUTOMATED COUNT: 3.38 M/UL (ref 3.8–5.4)
ERYTHROCYTE [DISTWIDTH] IN BLOOD: 13.8 % (ref 11.5–15.5)
HCT VFR BLD AUTO: 33 % (ref 34–46)
HDW: 2.49
HGB BLD-MCNC: 10.7 GM/DL (ref 11.4–16)
LUC NFR BLD AUTO: 1 %
LYMPHOCYTES # SPEC AUTO: 1.2 K/UL (ref 1–4.8)
LYMPHOCYTES NFR SPEC AUTO: 9 %
MCH RBC QN AUTO: 31.6 PG (ref 25–35)
MCHC RBC AUTO-ENTMCNC: 32.3 G/DL (ref 31–37)
MCV RBC AUTO: 97.8 FL (ref 80–100)
MONOCYTES # BLD AUTO: 0.5 K/UL (ref 0–1)
MONOCYTES NFR BLD AUTO: 4 %
NEUTROPHILS # BLD AUTO: 11.1 K/UL (ref 1.3–7.7)
NEUTROPHILS NFR BLD AUTO: 86 %
WBC # BLD AUTO: 0.13 10*3/UL
WBC # BLD AUTO: 12.9 K/UL (ref 3.8–10.6)
WBC (PEROX): 13.46

## 2017-02-20 RX ADMIN — ALVIMOPAN SCH MG: 12 CAPSULE ORAL at 20:00

## 2017-02-20 RX ADMIN — AMPICILLIN SODIUM AND SULBACTAM SODIUM SCH MLS/HR: 2; 1 INJECTION, POWDER, FOR SOLUTION INTRAMUSCULAR; INTRAVENOUS at 23:00

## 2017-02-20 RX ADMIN — LABETALOL HCL SCH MG: 100 TABLET, FILM COATED ORAL at 20:00

## 2017-02-20 RX ADMIN — ASPIRIN 81 MG CHEWABLE TABLET SCH MG: 81 TABLET CHEWABLE at 08:29

## 2017-02-20 RX ADMIN — METRONIDAZOLE SCH MLS/HR: 500 INJECTION, SOLUTION INTRAVENOUS at 08:24

## 2017-02-20 RX ADMIN — AMPICILLIN SODIUM AND SULBACTAM SODIUM SCH MLS/HR: 2; 1 INJECTION, POWDER, FOR SOLUTION INTRAMUSCULAR; INTRAVENOUS at 05:46

## 2017-02-20 RX ADMIN — METRONIDAZOLE SCH MLS/HR: 500 INJECTION, SOLUTION INTRAVENOUS at 15:14

## 2017-02-20 RX ADMIN — METOCLOPRAMIDE SCH MG: 5 INJECTION, SOLUTION INTRAMUSCULAR; INTRAVENOUS at 05:46

## 2017-02-20 RX ADMIN — CEFAZOLIN SCH MLS/HR: 330 INJECTION, POWDER, FOR SOLUTION INTRAMUSCULAR; INTRAVENOUS at 05:41

## 2017-02-20 RX ADMIN — HEPARIN SODIUM SCH UNIT: 5000 INJECTION, SOLUTION INTRAVENOUS; SUBCUTANEOUS at 00:46

## 2017-02-20 RX ADMIN — HEPARIN SODIUM SCH UNIT: 5000 INJECTION, SOLUTION INTRAVENOUS; SUBCUTANEOUS at 15:15

## 2017-02-20 RX ADMIN — METRONIDAZOLE SCH MLS/HR: 500 INJECTION, SOLUTION INTRAVENOUS at 01:11

## 2017-02-20 RX ADMIN — METOCLOPRAMIDE SCH MG: 5 INJECTION, SOLUTION INTRAMUSCULAR; INTRAVENOUS at 23:00

## 2017-02-20 RX ADMIN — FLUCONAZOLE SCH MG: 100 TABLET ORAL at 21:29

## 2017-02-20 RX ADMIN — AMPICILLIN SODIUM AND SULBACTAM SODIUM SCH MLS/HR: 2; 1 INJECTION, POWDER, FOR SOLUTION INTRAMUSCULAR; INTRAVENOUS at 11:29

## 2017-02-20 RX ADMIN — THERA TABS SCH EACH: TAB at 11:29

## 2017-02-20 RX ADMIN — CEFAZOLIN SCH MLS/HR: 330 INJECTION, POWDER, FOR SOLUTION INTRAMUSCULAR; INTRAVENOUS at 14:54

## 2017-02-20 RX ADMIN — HYDROMORPHONE HYDROCHLORIDE PRN MG: 1 INJECTION, SOLUTION INTRAMUSCULAR; INTRAVENOUS; SUBCUTANEOUS at 00:46

## 2017-02-20 RX ADMIN — METOCLOPRAMIDE SCH MG: 5 INJECTION, SOLUTION INTRAMUSCULAR; INTRAVENOUS at 11:29

## 2017-02-20 RX ADMIN — ALVIMOPAN SCH MG: 12 CAPSULE ORAL at 08:29

## 2017-02-20 RX ADMIN — METOCLOPRAMIDE SCH MG: 5 INJECTION, SOLUTION INTRAMUSCULAR; INTRAVENOUS at 17:25

## 2017-02-20 RX ADMIN — HEPARIN SODIUM SCH UNIT: 5000 INJECTION, SOLUTION INTRAVENOUS; SUBCUTANEOUS at 08:24

## 2017-02-20 RX ADMIN — HEPARIN SODIUM SCH UNIT: 5000 INJECTION, SOLUTION INTRAVENOUS; SUBCUTANEOUS at 23:00

## 2017-02-20 RX ADMIN — AMPICILLIN SODIUM AND SULBACTAM SODIUM SCH MLS/HR: 2; 1 INJECTION, POWDER, FOR SOLUTION INTRAMUSCULAR; INTRAVENOUS at 17:17

## 2017-02-20 RX ADMIN — CEFAZOLIN SCH MLS/HR: 330 INJECTION, POWDER, FOR SOLUTION INTRAMUSCULAR; INTRAVENOUS at 17:19

## 2017-02-20 NOTE — P.PN
Subjective





84-year-old female being seen on rounds.  Patient currently is resting in bed.  

Patient is postop day 3 status post takedown of colovesical fistula with a 

laparoscopic sigmoid resection and bilateral urethral stent placement.  

Currently has a Vito-Caba drain in place in the right lower quadrant with 

an indwelling Aguilar catheter in place patient states no nausea vomiting does 

feel slightly bloated no stool





Objective





- Vital Signs


Vital signs: 


 Vital Signs











Temp  98.0 F   02/20/17 07:00


 


Pulse  95   02/20/17 07:00


 


Resp  16   02/20/17 07:00


 


BP  147/72   02/20/17 07:00


 


Pulse Ox  95   02/20/17 07:00








 Intake & Output











 02/19/17 02/20/17 02/20/17





 18:59 06:59 18:59


 


Intake Total 1600 0 


 


Output Total 1410 780 


 


Balance 190 -780 


 


Intake:   


 


    


 


    ceFAZolin 2 gm In Sodium 100  





    Chloride 0.9% 100 ml @   





    100 mls/hr IVPB Q8HR [facility]   





    Rx#:[id number]   


 


  Intake, IV Titration 1500  





  Amount   


 


    Levofloxacin 500Mg-D5w 100  





    Pmx 500 mg In Dextrose/   





    Water 1 100ml.bag @ 100   





    mls/hr IVPB Q24HR [facility] Rx#   





    :[id number]   


 


    Sodium Chloride 0.9% 1, 800  





    000 ml @ 100 mls/hr IV .   





    Q10H [facility] Rx#:[id number]   


 


    Sodium Chloride 0.9% 500 500  





    ml @ 999 mls/hr IV .Q31M   





    [facility] Rx#:[id number]   


 


    metroNIDAZOLE-NS  100  





    mg In Saline 1 100ml.bag   





    @ 100 mls/hr IVPB Q8HR   





    [facility] Rx#:[id number]   


 


  Oral  0 


 


Output:   


 


  Drainage 210 180 


 


    Right Abdomen 210 180 


 


  Urine 1200 600 


 


    Uretheral (Aguilar)  600 


 


Other:   


 


  Voiding Method Indwelling Catheter Indwelling Catheter Indwelling Catheter














- Exam





GENERAL APPEARANCE: 84-year-old female patient is alert, oriented, in no acute 

distress.  Pleasant cooperative resting in bed states has not been out of bed 

this morning


VITAL SIGNS: Reviewed


HEENT: Head is normocephalic and atraumatic. Pupils are equal and reactive. The 

nares are patent. Oropharynx is clear without lesions.


NECK: Supple without lymphadenopathy. Traches midline.


HEART: S1, S2. Regular rate and rhythm.  No murmur noted denying chest pain


LUNGS: No crackles or wheezes are heard.  No conversational dyspnea noted no 

cough noted no shortness of breath


ABDOMEN: Soft, slight tenderness with palpitation to the abdominal wall slight 

distended with few hypoactive bowel tones. No peritoneal signs. No palpable 

organomegaly or masses.  Indwelling Aguilar catheter in place.  Vito-Caba 

drain with serosanguineous drainage noted right lower quadrant


EXTREMITIES: Normal skin color and turgor. No cyanosis, rash, ulceration, 

clubbing or edema. Radial pedal pulses are 2/4 bilaterally.


NEUROLOGICAL: No focal deficits. Strength and sensation are grossly intact.








- Labs


CBC & Chem 7: 


 02/20/17 08:14





 02/19/17 07:19


Labs: 


 Abnormal Lab Results - Last 24 Hours (Table)











  02/19/17 02/20/17 Range/Units





  10:00 08:14 


 


WBC   12.9 H  (3.8-10.6)  k/uL


 


RBC   3.38 L  (3.80-5.40)  m/uL


 


Hgb   10.7 L  (11.4-16.0)  gm/dL


 


Hct   33.0 L  (34.0-46.0)  %


 


Neutrophils #   11.1 H  (1.3-7.7)  k/uL


 


Urine Appearance  Turbid H   (Clear)  


 


Urine Protein  2+ H   (Negative)  


 


Urine Ketones  Trace H   (Negative)  


 


Urine Blood  Large H   (Negative)  


 


Ur Leukocyte Esterase  Moderate H   (Negative)  


 


Urine RBC  >182 H   (0-5)  /hpf


 


Urine WBC  105 H   (0-5)  /hpf


 


Urine WBC Clumps  Moderate H   (None)  /hpf


 


Hyaline Casts  9 H   (0-2)  /lpf








 Microbiology - Last 24 Hours (Table)











 02/19/17 10:00 Urine Culture - Preliminary





 Urine,Catheterized 


 


 02/17/17 10:50 Gram Stain - Final





 Aspirate Body Fluid Culture - Final





    Escherichia coli














Assessment and Plan


Plan: 





Impression


(1) Colovesical fistula


Status: Chronic   





(2) Perforation of sigmoid colon due to diverticulitis


Status: Chronic   





(3) Status post colectomy


Status: Acute   





(4) S/P bladder repair


Status: Acute   





(5) Urinary tract infection


Status: Chronic   





(6) Pleural effusion


Status: Chronic   





(7) E coli infection


Status: Acute   





Plan


Continue work limitations by infectious disease


Follow up on blood cultures pending


Continue Flagyl and Unasyn per infectious diseases recommendations


Postop surgical care


Increase activity to level of tolerance


Keep indwelling Aguilar catheter in place


DVT and GI prophylaxis


Further recommendations pending








The above dictated assessment and findings were discussed with Dr. Roberson.  

Impression and the plan of care have been dictated as directed.  Cookie Muñoz 

nurse practitioner acting as a scribe for dr Roberson

## 2017-02-20 NOTE — P.PN
Progress Note - Text





She has increased abdominal distention.


Place NGT


Transfer to surgical floor as she is a high risk post-surgical patient.


Ambulate at least QID.


Start diflucan for yeast in urine.

## 2017-02-20 NOTE — CONS
DATE OF CONSULTATION:  02/19/2017



REASON FOR CONSULTATION: Medical management, requested Dr. Roberson. 



CONSULTATION: This is a pleasant 84-year-old patient of Dr. Becerra. 

The patient's family is at the bedside. The patient underwent primary 

colovesicular anastomosis and repair of colovesical fistula and 

laparoscopic sigmoid colectomy for suspected complex perforated 

diverticulitis. The patient denies any nausea or vomiting. Some 

abdominal pain is present.  



REVIEW OF SYSTEMS: 

CONSTITUTIONAL: Tired. 

HEENT: Slightly decreased hearing. 

RESPIRATORY: None. 

CARDIOVASCULAR: None. 

GASTROINTESTINAL: Occasional heartburn. 

GENITOURINARY: None. 

MUSCULOSKELETAL: Aches and pains in the joints. '

DERMATOLOGICAL: None. 

HEMATOLOGICAL: None. 

LYMPHATICS: None. 

PSYCHIATRY: Slightly forgetful. 

GENITOURINARY: The patient also had urinary incontinence prior to 

surgery.  



PAST MEDICAL HISTORY: Hypertension, osteoarthritis, varicose veins, 

diverticulitis.  



PAST SURGICAL HISTORY: Tubal ligation. 



SOCIAL HISTORY: Patient smoked about 15 years, stopped smoking when 

she was 30 years old. No alcohol.  





FAMILY HISTORY: Cancer. 



HOME MEDICATIONS: 

1. Multivitamin 1 tablet p.o. daily. 

2. Trandate 100 mg p.o. bedtime. 

3. Aspirate 81 mg daily.  



ALLERGIES: None. 



On examination, temperature 98, pulse 65, respirations 16, blood 

pressure 132/64, pulse ox 94% on 2 liters.  

GENERAL APPEARANCE: Average built. Lying in bed. 

HEENT: Pupils equal. Conjunctivae normal. Oral cavity normal. 

NECK: JVD not raised. Mass not palpable. 

LUNGS: Respiratory effort normal. Fair air entry. 

CARDIOVASCULAR: 1st and 2nd heart sounds normal, no edema. 

ABDOMEN: Dressing in place. Bowel sounds are sluggish. Tenderness 

present. No liver or spleen palpable.  

PSYCHIATRY: Alert, oriented x3. Mood and affect normal. 

NEUROLOGIC: Pupils equal. Cranial nerves grossly intact. Power and 

sensation grossly intact.  

MUSCULOSKELETAL: Evidence of osteoarthritis, especially in hands and 

knees.  



INVESTIGATIONS: White count 16.4, hemoglobin 10.8, potassium 4.7, BUN 

and creatinine normal. UA positive for leukocyte esterase and WBC.  



ASSESSMENT: 

1. Acute severe urinary tract infection in a patient who had a 

colovesical fistula.  

2. Hypoalbuminemia, likely acute phase reactant. 

3. Leukocytosis from underlying urinary tract infection. 

4. Primary osteoarthritis of multiple joints bilateral. 

5. Essential hypertension. 

6. Chronic urinary incontinence. 



PLAN: Patient is currently on IV Unasyn and Flagyl. Home medications 

are resumed. Encouraged to be out of bed. Urine cultures are pending. 

Bloody fluids are growing Escherichia coli. Care was discussed with 

the family at the bedside. Thank you, Dr. Roberson.

## 2017-02-20 NOTE — XR
EXAMINATION TYPE: XR chest 2V

 

DATE OF EXAM: 2/20/2017 6:35 AM

 

COMPARISON: Prior chest x-ray 19th of February 2017

 

HISTORY: Pleural effusion

 

TECHNIQUE:  Frontal and lateral views of the chest are obtained.

 

FINDINGS:  Findings are similar. Pleural thickening is suspected along the posterior left and right c
hest seen better on the lateral exam. No evident pneumothorax. The heart appears borderline enlarged 
although the patient is rotated, the baby scoliosis. No evident pneumothorax.

 

IMPRESSION:  Similar findings to prior exam. There may be some loculated small pleural effusion.

## 2017-02-21 RX ADMIN — HEPARIN SODIUM SCH UNIT: 5000 INJECTION, SOLUTION INTRAVENOUS; SUBCUTANEOUS at 15:44

## 2017-02-21 RX ADMIN — METOCLOPRAMIDE SCH MG: 5 INJECTION, SOLUTION INTRAMUSCULAR; INTRAVENOUS at 12:42

## 2017-02-21 RX ADMIN — CEFAZOLIN SCH MLS/HR: 330 INJECTION, POWDER, FOR SOLUTION INTRAMUSCULAR; INTRAVENOUS at 00:27

## 2017-02-21 RX ADMIN — THERA TABS SCH: TAB at 12:42

## 2017-02-21 RX ADMIN — ASPIRIN 81 MG CHEWABLE TABLET SCH MG: 81 TABLET CHEWABLE at 07:51

## 2017-02-21 RX ADMIN — METRONIDAZOLE SCH MLS/HR: 500 INJECTION, SOLUTION INTRAVENOUS at 23:32

## 2017-02-21 RX ADMIN — HEPARIN SODIUM SCH UNIT: 5000 INJECTION, SOLUTION INTRAVENOUS; SUBCUTANEOUS at 23:33

## 2017-02-21 RX ADMIN — AMPICILLIN SODIUM AND SULBACTAM SODIUM SCH MLS/HR: 2; 1 INJECTION, POWDER, FOR SOLUTION INTRAMUSCULAR; INTRAVENOUS at 17:18

## 2017-02-21 RX ADMIN — METOCLOPRAMIDE SCH MG: 5 INJECTION, SOLUTION INTRAMUSCULAR; INTRAVENOUS at 23:34

## 2017-02-21 RX ADMIN — AMPICILLIN SODIUM AND SULBACTAM SODIUM SCH MLS/HR: 2; 1 INJECTION, POWDER, FOR SOLUTION INTRAMUSCULAR; INTRAVENOUS at 12:42

## 2017-02-21 RX ADMIN — FLUCONAZOLE SCH MG: 100 TABLET ORAL at 21:04

## 2017-02-21 RX ADMIN — ALVIMOPAN SCH MG: 12 CAPSULE ORAL at 21:23

## 2017-02-21 RX ADMIN — CEFAZOLIN SCH: 330 INJECTION, POWDER, FOR SOLUTION INTRAMUSCULAR; INTRAVENOUS at 14:23

## 2017-02-21 RX ADMIN — AMPICILLIN SODIUM AND SULBACTAM SODIUM SCH MLS/HR: 2; 1 INJECTION, POWDER, FOR SOLUTION INTRAMUSCULAR; INTRAVENOUS at 05:30

## 2017-02-21 RX ADMIN — METOCLOPRAMIDE SCH MG: 5 INJECTION, SOLUTION INTRAMUSCULAR; INTRAVENOUS at 05:30

## 2017-02-21 RX ADMIN — LABETALOL HCL SCH MG: 100 TABLET, FILM COATED ORAL at 21:04

## 2017-02-21 RX ADMIN — ALVIMOPAN SCH MG: 12 CAPSULE ORAL at 10:19

## 2017-02-21 RX ADMIN — METRONIDAZOLE SCH MLS/HR: 500 INJECTION, SOLUTION INTRAVENOUS at 15:43

## 2017-02-21 RX ADMIN — CEFAZOLIN SCH: 330 INJECTION, POWDER, FOR SOLUTION INTRAMUSCULAR; INTRAVENOUS at 02:27

## 2017-02-21 RX ADMIN — METOCLOPRAMIDE SCH: 5 INJECTION, SOLUTION INTRAMUSCULAR; INTRAVENOUS at 17:18

## 2017-02-21 RX ADMIN — METRONIDAZOLE SCH MLS/HR: 500 INJECTION, SOLUTION INTRAVENOUS at 07:51

## 2017-02-21 RX ADMIN — CEFAZOLIN SCH MLS/HR: 330 INJECTION, POWDER, FOR SOLUTION INTRAMUSCULAR; INTRAVENOUS at 12:42

## 2017-02-21 RX ADMIN — HEPARIN SODIUM SCH UNIT: 5000 INJECTION, SOLUTION INTRAVENOUS; SUBCUTANEOUS at 07:51

## 2017-02-21 RX ADMIN — METRONIDAZOLE SCH MLS/HR: 500 INJECTION, SOLUTION INTRAVENOUS at 00:24

## 2017-02-21 RX ADMIN — CEFAZOLIN SCH MLS/HR: 330 INJECTION, POWDER, FOR SOLUTION INTRAMUSCULAR; INTRAVENOUS at 21:03

## 2017-02-21 NOTE — PN
DATE OF SERVICE: 02/20/2017



REASON FOR FOLLOW UP: Abdominal abscess. 



INTERVAL HISTORY: The patient is afebrile. She has been breathing 

comfortably. Denies significant chest pain, shortness of breath or 

cough. No significant abdominal pain.   



On examination, blood pressure is 140/81 with a pulse of 89, 

temperature 97.7. She was on 5%.  



GENERAL: Elderly female lying in bed in no distress. 

RESPIRATORY SYSTEM: Unlabored breathing. Clear to auscultation 

anteriorly.  

HEART: S1, S2. Regular rate and rhythm. 

ABDOMEN: Soft, no tenderness. 



LABS: Hemoglobin is 10.7, white count 12.9. Urine is showing candida 

albicans.  



DIAGNOSTIC IMPRESSION AND PLAN: Patient with abdominal abscess, 

diverticulitis, the patient did have complicated diverticulitis with 

colorectal fistula status post revision of the same and underwent 

colectomy. Abdominal culture with anaerobic gram negative in addition 

to E. coli. Continue Unasyn , Diflucan will be added to cover yeast UTI with 

recent ureteral stents placement. Awaiting for ID on this anaerobic gram 
negative and white count 

to normalize. continue supportive care.   



YOLYD

## 2017-02-21 NOTE — P.PN
Progress Note - Text





Please see NPs note, Cookie Muñoz.





Patient re-evaluated this evening.


She had 2 bowel movements (not recorded.)


She is passing much flatus.


No reports of nausea or vomiting.


Abdomen is soft, nontender.


Dressing is clean, dry and intact.





Her blanco catheter output is clear.





Plan to remove catheter in the morning.


Start diet tonight.


Boost for supplement.


Discharge home for tomorrow with home health care.

## 2017-02-21 NOTE — P.PN
Subjective





84-year-old female being seen on rounds this morning.  Abdomen is softer 

compared to prior assessment.  Patient states is passing gas anal.  And did 

report having a bowel movement yesterday.  Patient does have an indwelling 

Aguilar catheter in place patient remains nothing by mouth except for ice chips.  

Nursing reports the patient has ambulated in the wing this morning patient 

reports having one bowel movement this morning








Patient is postop day 4 status post takedown of colovesical fistula with a 

laparoscopic sigmoid resection and bilateral urethral stent placement.  

Currently has a Viot-Caba drain in place in the right lower quadrant with 

an indwelling Aguilar catheter in place patient states no nausea vomiting patient 

states feeling better this morning





Objective





- Vital Signs


Vital signs: 


 Vital Signs











Temp  96.9 F L  02/21/17 07:00


 


Pulse  108 H  02/21/17 07:00


 


Resp  16   02/21/17 07:00


 


BP  126/76   02/21/17 07:00


 


Pulse Ox  92 L  02/21/17 07:00








 Intake & Output











 02/20/17 02/21/17 02/21/17





 18:59 06:59 18:59


 


Output Total 350 230 120


 


Balance -350 -230 -120


 


Weight 63.503 kg  


 


Output:   


 


  Drainage 50 30 20


 


    Right Abdomen 50 30 20


 


  Urine 300 200 100


 


    Uretheral (Aguilar)   100


 


Other:   


 


  Voiding Method Indwelling Catheter Indwelling Catheter Indwelling Catheter


 


  # Voids  1 














- Exam





Physical exam


84-year-old female resting in bed states has been up ambulating short distance 

in the hallway this morning


Lungs essentially clear adequate air movement on room air sats are 95%


Heart S1-S2 audible regular denying chest pain


Abdomen Vito-Caba drain left lower quadrant with an indwelling Aguilar 

catheter.  Soft surgical tenderness to the site no redness at the surgical site 

reports no nausea vomiting one bowel movement this morning the blood noted


Extremities no evidence of edema to the lower extremities





- Labs


CBC & Chem 7: 


 02/20/17 08:14





 02/19/17 07:19


Labs: 


 Microbiology - Last 24 Hours (Table)











 02/19/17 15:24 Blood Culture - Preliminary





 Blood    No Growth after 24 hours


 


 02/19/17 10:00 Urine Culture - Final





 Urine,Catheterized    Candida albicans


 


 02/17/17 10:50 Anaerobic Culture - Final





 Aspirate    Anaerobic Gm Negative Bacilli





    Anaerobic Gm Negative Bacilli#2














Assessment and Plan


Plan: 





Impression


(1) Colovesical fistula


Status: Chronic   





(2) Perforation of sigmoid colon due to diverticulitis


Status: Chronic   





(3) Status post colectomy


Status: Acute   





(4) S/P bladder repair


Status: Acute   





(5) Urinary tract infection


Status: Chronic   





(6) Pleural effusion


Status: Chronic   





(7) E coli infection


Status: Acute   


UTI with Candida albicans














Plan


Diflucan for yeast in the urine will initiate treatment


Continue recommendations antibiotic by infectious disease


Follow up on blood cultures pending


Continue Flagyl and Unasyn per infectious diseases recommendations


Postop surgical care


Increase activity to level of tolerance


Keep indwelling Aguilar catheter in place


DVT and GI prophylaxis


Ambulate patient at least 4 times


Transfer to surgical unit as patient is high risk postsurgical


Further recommendations pending








The above dictated assessment and findings were discussed with Dr. Roberson.  

Impression and the plan of care have been dictated as directed.  Cookie Muñoz 

nurse practitioner acting as a scribe for dr Roberson

## 2017-02-21 NOTE — PN
DATE OF SERVICE: 02/21/2017



PRESENTING COMPLAINT: Abdominal surgery. 



INTERVAL HISTORY: Patient is status post abdominal surgery this 

morning. Doing better. Still on ice chips. Had some small bowel 

movement. Has passed flatus (      ) much better. Has been out of bed. 

Did walk in the hallway. Feeling better.  



Review of systems done for constitutional, cardiovascular, GI, 

pulmonary; relevant findings as above.  



Current medications are reviewed that include IV Unasyn and Flagyl. 



On examination, temperature 98.7, pulse 94, respiration 18, blood 

pressure 154/95, pulse ox 93% on room air.  

GENERAL APPEARANCE: Lying in bed, comfortable. 

EYES: Pupils equal. Conjunctivae normal. 

NECK: JVD not raised. Mass not palpable. 

RESPIRATORY: Effort normal. Lungs are clear. 

CARDIOVASCULAR: First and second sounds normal. No edema. 

ABDOMEN: Slightly distended. Bowel sounds sluggish. Minimal 

tenderness. Abdominal drainage present.  

PSYCHIATRY: Alert and oriented x3. Mood and affect normal. 



INVESTIGATIONS: No blood work from today. 



ASSESSMENT: 

1. Acute severe urinary tract infection in a patient who has had 

colovesical fistula with body fluid culture growing Escherichia coli 

and candida.  

2. Hypoalbuminemia, likely acute phase reactant. 

3. Leukocytosis from underlying urinary tract infection, improving. 

4. 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 

5. Essential hypertension. 

6. Chronic urinary incontinence. 

7. Status post primary colorectal anastomosis and repair of 

colovesical fistula and laparoscopic sigmoid colectomy for complex 

perforated diverticulitis. Clinically patient is doing much better. 

Diet will be advanced (      ) Surgery. Diflucan was added yesterday. 

Antibiotic course to be completed as per ID. Care was discussed with 

the patient.

## 2017-02-21 NOTE — PN
DATE OF SERVICE:  02/21/2017



Reason for followup is abdominal abscess and UTI.  



INTERVAL HISTORY: The patient is afebrile. She is currently breathing 

comfortably. Denies any significant chest pain or shortness of breath, 

no cough, no abdominal pain. Has been tolerating ice chips and take 

her pills with water.  



On examination, blood pressure 126/76 with pulse of 108, temperature 

96.9, she is 92% on room air. General description is an elderly 

female, lying in bed in no distress.  

RESPIRATORY SYSTEM: Unlabored breathing. Clear to auscultation 

anteriorly.  

HEART: S1, S2, regular rate and rhythm. 

ABDOMEN: Soft, incision is currently dressed, no drainage.  

EXTREMITIES:  No edema of the feet. 



LABS: No new labs have been obtained today. 



DIAGNOSTIC IMPRESSION AND PLAN: 

1. Patient with polymicrobial abdominal abscess from diverticulitis, 

status post drainage of the abscess.  Patient is currently on Unasyn 

and Flagyl that will be continued.  Hopefully, once oral intakes

improved, transition to oral antibiotics.  

2. Patient with a yeast urinary tract infection, currently covered 

with Diflucan, repeat the CBC tomorrow. Continue supportive care.  



[facility]

## 2017-02-22 VITALS
DIASTOLIC BLOOD PRESSURE: 79 MMHG | SYSTOLIC BLOOD PRESSURE: 145 MMHG | TEMPERATURE: 96.6 F | HEART RATE: 92 BPM | RESPIRATION RATE: 18 BRPM

## 2017-02-22 LAB
ANION GAP SERPL CALC-SCNC: 16 MMOL/L
BASOPHILS # BLD AUTO: 0 K/UL (ref 0–0.2)
BASOPHILS NFR BLD AUTO: 0 %
BUN SERPL-SCNC: 12 MG/DL (ref 7–17)
CALCIUM SPEC-MCNC: 8.4 MG/DL (ref 8.4–10.2)
CH: 31.8
CHCM: 31.6
CHLORIDE SERPL-SCNC: 108 MMOL/L (ref 98–107)
CO2 SERPL-SCNC: 18 MMOL/L (ref 22–30)
EOSINOPHIL # BLD AUTO: 0 K/UL (ref 0–0.7)
EOSINOPHIL NFR BLD AUTO: 0 %
ERYTHROCYTE [DISTWIDTH] IN BLOOD BY AUTOMATED COUNT: 3.51 M/UL (ref 3.8–5.4)
ERYTHROCYTE [DISTWIDTH] IN BLOOD: 13.9 % (ref 11.5–15.5)
GLUCOSE SERPL-MCNC: 154 MG/DL (ref 74–99)
HCT VFR BLD AUTO: 35.6 % (ref 34–46)
HDW: 2.58
HGB BLD-MCNC: 10.9 GM/DL (ref 11.4–16)
LUC NFR BLD AUTO: 1 %
LYMPHOCYTES # SPEC AUTO: 1.1 K/UL (ref 1–4.8)
LYMPHOCYTES NFR SPEC AUTO: 11 %
MAGNESIUM SPEC-SCNC: 1.8 MG/DL (ref 1.6–2.3)
MCH RBC QN AUTO: 30.9 PG (ref 25–35)
MCHC RBC AUTO-ENTMCNC: 30.5 G/DL (ref 31–37)
MCV RBC AUTO: 101.2 FL (ref 80–100)
MONOCYTES # BLD AUTO: 0.4 K/UL (ref 0–1)
MONOCYTES NFR BLD AUTO: 4 %
NEUTROPHILS # BLD AUTO: 8.7 K/UL (ref 1.3–7.7)
NEUTROPHILS NFR BLD AUTO: 84 %
NON-AFRICAN AMERICAN GFR(MDRD): >60
PHOSPHATE SERPL-MCNC: 3.2 MG/DL (ref 2.5–4.5)
POTASSIUM SERPL-SCNC: 3.5 MMOL/L (ref 3.5–5.1)
SODIUM SERPL-SCNC: 142 MMOL/L (ref 137–145)
WBC # BLD AUTO: 0.09 10*3/UL
WBC # BLD AUTO: 10.3 K/UL (ref 3.8–10.6)
WBC (PEROX): 11.21

## 2017-02-22 RX ADMIN — ALVIMOPAN SCH MG: 12 CAPSULE ORAL at 07:48

## 2017-02-22 RX ADMIN — HEPARIN SODIUM SCH UNIT: 5000 INJECTION, SOLUTION INTRAVENOUS; SUBCUTANEOUS at 07:48

## 2017-02-22 RX ADMIN — AMPICILLIN SODIUM AND SULBACTAM SODIUM SCH MLS/HR: 2; 1 INJECTION, POWDER, FOR SOLUTION INTRAMUSCULAR; INTRAVENOUS at 00:51

## 2017-02-22 RX ADMIN — METOCLOPRAMIDE SCH MG: 5 INJECTION, SOLUTION INTRAMUSCULAR; INTRAVENOUS at 05:18

## 2017-02-22 RX ADMIN — CEFAZOLIN SCH MLS/HR: 330 INJECTION, POWDER, FOR SOLUTION INTRAMUSCULAR; INTRAVENOUS at 04:24

## 2017-02-22 RX ADMIN — METRONIDAZOLE SCH MLS/HR: 500 INJECTION, SOLUTION INTRAVENOUS at 07:48

## 2017-02-22 RX ADMIN — ASPIRIN 81 MG CHEWABLE TABLET SCH MG: 81 TABLET CHEWABLE at 07:48

## 2017-02-22 RX ADMIN — AMPICILLIN SODIUM AND SULBACTAM SODIUM SCH MLS/HR: 2; 1 INJECTION, POWDER, FOR SOLUTION INTRAMUSCULAR; INTRAVENOUS at 05:19

## 2017-02-22 RX ADMIN — THERA TABS SCH EACH: TAB at 12:37

## 2017-02-22 NOTE — P.OP
Date of Procedure: 02/17/17


Description of Procedure: 








SURGEON:  KEARA JONAS MD


ASSISTANT:  EWA GATES.


PREOPERATIVE DIAGNOSES:  


1. Recurrent sigmoid diverticulitis with complex perforated diverticulitis. 


2. History of chronic left lower quadrant abdominal pain. 


3. Hypertension. 


4. Colovesical fistula from complicated diverticulitis.


5. Recurrent urinary tract infection from colovesical fistula.


6. Previous history of pleural effusion. 


7. History of osteoarthritis. 





POSTOPERATIVE DIAGNOSES:  


1. Recurrent sigmoid diverticulitis with complex perforated diverticulitis. 


2. History of chronic left lower quadrant abdominal pain. 


3. Hypertension. 


4. Colovesical fistula from complicated diverticulitis.


5. Recurrent urinary tract infection from colovesical fistula.


6. Previous history of pleural effusion. 


7. History of osteoarthritis. 


8. Intrapelvic adhesions, sigmoid colon, to the left lower pelvis. 


9. Left pelvic contained abscess from perforated diverticulitis.





PROCEDURES PERFORMED:  


1. Laparoscopic mobilization of splenic flexure


2. Laparoscopic takedown and repair of colovesical fistula


3. Laparoscopic sigmoid colectomy with primary colorectal anastomosis with 29 

mm ILS


4. Placement of round #19 drain, left pelvis


5. Intraoperative flexible sigmoidoscopy using an Olympus colonoscope. 


6. Bilateral ureteral stent placement by Urology. 


7. Intraoperative drainage of left pelvic abscess from perforated 

diverticulitis.





ANESTHESIA: General with 60 mL 0.25% Marcaine with epinephrine. 


ESTIMATED BLOOD LOSS: 75 mL. 


SPECIMENS REMOVED: 


1. Sigmoid colon and donuts.


2.  Aerobic and anaerobic culture peritoneal fluid, left pelvis intraperitoneal 

abscess.


COMPLICATIONS: None. 





INDICATIONS: The patient is an 84 -year-old female who comes in with


a history of complicated sigmoid diverticulitis resulting in a colovesical 


fistula and chronic urinary tract infection.  She had a previous colonoscopy 

performed with the last 


12 months done by a different provider.  She underwent an enhanced recovery 

protocol


for colorectal cases. Benefits and risks of the procedure, including but not 

limited to bleeding, infection, injury to the bowel, potential of ostomy 

creation, injury to the ureters, need for further surgery and 


potential open technique were described at length. Informed consent had been 

obtained. 





DESCRIPTION: Patient was brought into the operating room and 


underwent general intubation without sequelae. Bilateral


ureteral stents with placement of blanco catheter were placed per Urology. 

Please see their separate


operative report. 





The patient was repositioned in a modified lithotomy position. 





The abdomen had been prepped and draped in standard sterile fashion


using Ioban draping. 





After a timeout protocol with the surgical team, a left upper quadrant


incision was made after anesthetizing the skin with 0.25% Marcaine and


epinephrine. A 0-degree 5 mm laparoscopic trocar entry was performed.


Diagnostic laparoscopy demonstrated no injury to bowel, viscera or


mesentery. Separately adhesions were confirmed at the left lower


quadrant along the sigmoid colon to the left pelvis. The rest of the small


bowel was unremarkable. 





Two 5 mm trocars were then placed along the right lateral abdominal


wall. She was then placed in Trendelenburg position with the left side


up. 





Initial dissection was along the descending colon at the white


line of Toldt using fenestrated graspers and using electro-Bovie


cautery. The sigmoid colon was also mobilized to the pelvic inlet. 





She had minimal redundancy of the sigmoid colon hence mobilization of the 

splenic flexure was performed.


The splenic flexure was mobilized in a anterograde and retrograde fashion using 

Sonicision including electro-Bovie cautery without injury to the colon or small 

bowel.  The spleen was also unharmed.  Hemostasis was excellent upon takedown 

of the splenocolic ligament. 





Given the severe phlegmon reaction of the left pelvis including bone hard 

consistency of scar tissue,


the medium sized Errol wound protector was used.





The lower pelvis along the midline was measured for 7 cm along the


longitudinal fashion. The skin was anesthetized using 0.25% Marcaine


with epinephrine, including along the peritoneal plane. A #10 blade was


used to enter the peritoneum using pneumoinsufflation, of 15


mmHg pressure, which she tolerated well. The abdomen was entered and Errol


wound protector was placed. The colon was palpated for area of


disease along the sigmoid colon. Some redundancy of sigmoid colon


was confirmed down to the pelvic inlet. Additional adhesions were addressed


using a combination of cordless Harmonic scalpel, including


electro-Bovie cautery.  Using a combination of laparoscopic imaging, a phlegmon 

of the left pelvis was identified and entered with marsha purulence.  Aerobic 

and anaerobic cultures were sent using a Francisco trap.





The patient was placed in steep Trendelenburg position to mobilize the left 

lateral border of the sigmoid colon.  Lighted stents were used to enhance 

visualization of the left ureter from her severe pelvic adhesions.  Proximal 

border of resection was performed along the descending colon using a Covidien 

60 mm tri-stapler purple loads.


Along the left pelvis and left lateral border of the bladder, the colovesical 

fistula was palpated and identified.





At this point, an intraoperative colonoscope was used to confirm the 

colovesical fistulous track and identify any additional pathology along the 

sigmoid colon.  I went to the foot of the bed.  An Olympus colonoscope was 

prepared.  Using lubricant, the rectum was dilated.  An Olympus colonoscope was 

passed along the rectum and sigmoid colon where a sigmoid stricture was 

confirmed at 25 cm from the anal verge.  Using coordinated effort with the 

assistant, an area of purulence was identified via the colonoscopic view and 

confirmed with the assistant as the fistulous tract was previously identified 

via laparoscopy. The area of the fistulous tract was also marked using 

indelible marker along its serosa by the assistant.  I re-scrubbed into the 

case.





Distally, the rectosigmoid junction was easily mobile and free from scarring or 

inflammation.


The proximal rectum was also easily mobilized.  The peritoneum along the pelvis 

was scored to allow division of the most distal segment along the top of the 

rectum.  Using a combination of LigaSure, the rest of the thickened mesentery 

was also mobilized along the sacrum without injury to the sacral plexus.  As 

the colovesical fistula attachment was dissected, a Covidien tri-stapler 60 mm 

black loads was fired across the tissue hence closing the colovesical fistula.  

Using a contour Covidien


staple device, the distal colon resection was palpated, identified, and a window


was created along the mesentery. Contour stapler was fired for the most


distal end. Hemostasis was checked with electro-Bovie cautery, including 

LigaSure. 





Once the pelvis was found to be dry, the proximal portion of the colon


was prepared for an auto pursestring suture. Omental fat was cleaned off


the rest of the colon. Sizers were used, and a 29 mm sizer was selected.





Next, along the pelvis the rectal stump was found to be adequate. 





A sizer was placed along the rectum followed by a 29 mm EEA stapler. The 

stapler was brought anterior


to the staple line and then deployed. The anvil and needle were then


brought together. Once the stapler was closed, the compression had been


maintained for at least one minute. Hemostasis was excellent for this


portion of the procedure. The donuts were completely full-thickness and


in full continuity. 





I then went to the base of the bed to perform a leak test. The


proximal portion of the anastomosis was clamped using Apryl


instruments. 





Next, an Olympus colonoscope was used to enter along the rectum, and


just distal to the anastomosis was clearly viewed and imaging was


obtained. The anastomosis was confirmed to be full thickness and without


any evidence of leak. The GI tract was desufflated. 





I re-scrubbed into the case. The surgical team had changed their gowns and 

gloves


including using new instruments for abdominal closure.  A round of # 19 drain 

was placed along 


the left pelvis anterior to the anastomosis and brought out through the right 

lower abdomen.  A drain stitch of 2-0 nylon was placed.





The pelvis was found to be dry. All instruments and towels had been


verified for counts. A new set and draping was placed. 





The abdomen was closed using double-stranded 0 PDS.  The lower midline wound 

was irrigated with normal saline.  


A 1/4- inch Penrose drain was placed within the subcutaneous tissue to decrease 

risk of surgical site infection.  The skin was


reapproximated using 3-0 Vicryl followed by stainless skin staples. 


Final diagnostic laparoscopy confirmed complete closure of the abdomen without


encroachment or involvement of the small bowel or omentum. 





The incisions were reapproximated using 4-0 Monocryl in a subcuticular


fashion. Dermabond was applied to the rest of incisions. An island


Silverlon dressing was placed for the lower midline incision. 





The case was contaminated from her intrapelvic abscess drainage. 





At the end, bilateral ureteral stents were removed and found to be intact. The 

patient did


have gross hematuria prior to surgery which had continued postoperatively. 





At the end of the procedure, needle, sponge and instrument count was


verified correct by surgical technician. The patient was taken to the


post-anesthesia care unit in stable condition. Intraoperative findings


were reviewed with the patient's family, who was very pleased with the


level of care. 





FINDINGS:


1. Colovesical fistula along the left lateral bladder dome resected with stapler


2. Retained abscess from previous sigmoid diverticulitis with perforation


3. Sigmoid stricture from recurrent diverticulitis resected


4. High splenic flexure mobilized laparoscopically


5. 29 mm ILS for colorectal anastomosis


6. Negative leak test


7. REMEDIOS placed anterior to colorectal anastomosis via right lower quadrant


8. Lighted ureteral stents identified during the case and removed intact


9. Case is contaminated from left pelvic abscess from perforated diverticulitis.


10.  Penrose placed within the lower midline subcutaneous tissue for 

contaminated case.

## 2017-02-22 NOTE — PN
DATE OF SERVICE:  02/22/2017



REASON FOR FOLLOWUP:  

1. Abdominal abscess. 

2. Urinary tract infection. 



INTERVAL HISTORY: The patient is afebrile. She is breathing 

comfortably.  Denies significant chest pain, shortness of breath or 

cough. No abdominal pain or nausea or vomiting. Tolerating a diet and 

no significant diarrhea.  



On examination, blood pressure is 145/79 with a pulse of 92, 

temperature 96.6. She is 97% on room air. General description is an 

elderly female lying in bed in no distress.  

RESPIRATORY SYSTEM: Unlabored breathing. Clear to auscultation 

anteriorly.  

HEART: S1, S2. Regular rate and rhythm. 

ABDOMEN: Soft, slightly distended. No guarding or rigidity. 



LABS: Hemoglobin is 10.9, white count 10.3. BUN of 12, creatinine 0.61. 



DIAGNOSTIC IMPRESSION AND PLAN: 

1. Patient with abdominal abscess from ruptured diverticulitis. 

Culture with Escherichia coli and anaerobic gram-negative. She did 

well on the Unasyn and Flagyl. Plan to finish therapy with p.o. Ceftin 

and Flagyl.  

2. Yeast urinary tract infection. Recommend Diflucan 100 daily for 

another week.

## 2017-02-22 NOTE — P.PN
Subjective





84-year-old female being seen on rounds this morning.  Currently is resting in 

bed.  Did tolerate a full liquid diet.  Indwelling Aguilar catheter was removed 

at 6 AM this morning.  Patient has voided once without difficulty.  Patient 

does indicate that she does live with a spouse.  Nursing reports it does take 

the assist of 1 for patient to sit up and to use a bedside commode.  Patient 

needed supervision for gait with a rolling walker.  Patient does complain of 

fatigue which limits her tolerance for activity.  Nursing reports that they 

were able to ambulate the patient with the use of a walker yesterday with an by 

as





Objective





- Vital Signs


Vital signs: 


 Vital Signs











Temp  96.6 F L  02/22/17 07:00


 


Pulse  92   02/22/17 07:00


 


Resp  18   02/22/17 07:00


 


BP  145/79   02/22/17 07:00


 


Pulse Ox  97   02/22/17 07:00








 Intake & Output











 02/21/17 02/22/17 02/22/17





 18:59 06:59 18:59


 


Intake Total 1000 400 


 


Output Total 180 780 


 


Balance 820 -380 


 


Intake:   


 


  Intake, IV Titration 1000  





  Amount   


 


    Ampicillin-Sulbactam 3 gm 100  





    In Sodium Chloride 0.9%   





    100 ml @ 100 mls/hr IVPB   





    Q6HR CLOTILDE Rx#:[id number]   


 


    Sodium Chloride 0.9% 1, 800  





    000 ml @ 100 mls/hr IV .   





    Q10H CLOTILDE Rx#:[id number]   


 


    metroNIDAZOLE-NS  100  





    mg In Saline 1 100ml.bag   





    @ 100 mls/hr IVPB Q8HR   





    CLOTILDE Rx#:[id number]   


 


  Oral  400 


 


Output:   


 


  Drainage 80 80 


 


    Right Abdomen 80 80 


 


  Urine 100 700 


 


    Uretheral (Aguilar) 100 600 


 


Other:   


 


  Voiding Method Indwelling Catheter Indwelling Catheter 


 


  # Bowel Movements 1 1 1














- Exam





Physical exam


84-year-old female resting in bed states denying chest pain shortness of breath 

or abdominal pain when questioning


Lungs essentially clear adequate air movement on room air sats are 95%


Heart S1-S2 audible regular denying chest pain


Abdomen Vito-Caba drain left lower quadrant   Soft surgical tenderness to 

the site no redness at the surgical site reports no nausea vomiting one bowel 

movement indwelling Aguilar catheter removed at 6 AM patient has voided times 

once no difficulty there is no reports of nausea vomiting


Extremities no evidence of edema to the lower extremities





- Labs


CBC & Chem 7: 


 02/22/17 07:55





 02/22/17 07:55


Labs: 


 Abnormal Lab Results - Last 24 Hours (Table)











  02/22/17 02/22/17 Range/Units





  07:55 07:55 


 


RBC  3.51 L   (3.80-5.40)  m/uL


 


Hgb  10.9 L   (11.4-16.0)  gm/dL


 


MCV  101.2 H   (80.0-100.0)  fL


 


MCHC  30.5 L   (31.0-37.0)  g/dL


 


Neutrophils #  8.7 H   (1.3-7.7)  k/uL


 


Chloride   108 H  ()  mmol/L


 


Carbon Dioxide   18 L  (22-30)  mmol/L


 


Glucose   154 H  (74-99)  mg/dL








 Microbiology - Last 24 Hours (Table)











 02/19/17 15:24 Blood Culture - Preliminary





 Blood    No Growth after 48 hours














Assessment and Plan


Plan: 





Impression


(1) Colovesical fistula


Status: Chronic   





(2) Perforation of sigmoid colon due to diverticulitis


Status: Chronic   





(3) Status post colectomy


Status: Acute   





(4) S/P bladder repair


Status: Acute   





(5) Urinary tract infection


Status: Chronic   





(6) Pleural effusion


Status: Chronic   





(7) E coli infection


Status: Acute   


UTI with Candida albicans














Plan


Diflucan for yeast in the urine will initiate treatment


Continue recommendations antibiotic by infectious disease


Continue Flagyl and Unasyn per infectious diseases recommendations


Postop surgical care


Increase activity to level of tolerance


DVT and GI prophylaxis


Ambulate patient at least 4 times


Transfer to surgical unit as patient is high risk postsurgical


possible discharged








The above dictated assessment and findings were discussed with Dr. Roberson.  

Impression and the plan of care have been dictated as directed.  Cookie Muñoz 

nurse practitioner acting as a scribe for dr Roberson

## 2017-02-22 NOTE — P.DS
Providers


Date of admission: 


02/17/17 05:44





Expected date of discharge: 02/22/17


Attending physician: 


Kalli Roberson





Consults: 





 





02/19/17 14:47


Consult Physician Routine 


   Consulting Provider: Ed Aparicio


   Consult Reason/Comments: ecoli in peritoneal fluid


   Do you want consulting provider notified?: Already Contacted





02/19/17 14:56


Consult Physician Routine 


   Consulting Provider: Rico Akbar


   Consult Reason/Comments: Medical management


   Do you want consulting provider notified?: Already Contacted











Primary care physician: 


Joe Becerra








- Discharge Diagnosis(es)


(1) Colovesical fistula


Status: Chronic   





(2) Perforation of sigmoid colon due to diverticulitis


Status: Chronic   





(3) Status post colectomy


Status: Acute   





(4) S/P bladder repair


Status: Acute   





(5) Urinary tract infection


Status: Chronic   Onset Date: ~11/29/16   





(6) Pleural effusion


Status: Chronic   





(7) E coli infection


Status: Acute   


Hospital Course: 





The patient is an 84-year-old female with history of colovesical fistula 

secondary to chronic diverticulitis.  She underwent a laparoscopic sigmoid 

colectomy with takedown of a colovesical fistula.  With a history of chronic 

pleural effusion additional consultation was obtained including medicine and 

medical management.  She has history of chronic urinary tract infection from 

her colovesical fistula.  As she has history of a contaminated case, her E.coli 

infection was treated per infectious disease.  Infectious disease consultation 

was obtained.  Prior to discharge, she was passing flatus.  She was tolerating 

diet. Home healthcare was offered. She was discharge on antibiotics.


Pertinent Studies: 





Chest X-ray confirming chronic pleural effusion.


Procedures: 





1. Laparoscopic mobilization of splenic flexure.


2. Laparoscopic takedown and repair of colovesical fistula


3. Laparoscopic sigmoid colectomy with primary colorectal anastomosis


4. Placement of round #19 drain


5. Intraoperative colonoscopy for flexible sigmoidoscopy


Patient Condition at Discharge: Stable





Plan - Discharge Summary


New Discharge Prescriptions: 


Cefuroxime Axetil [Ceftin] 500 mg PO BID #28 tab


Fluconazole [Diflucan] 100 mg PO HS #7 tab


Furosemide [Lasix] 40 mg PO DAILY #2 tablet


Hydrocodone/Acetaminophen [Norco 5-325] 1 - 2 each PO Q6HR PRN #40 tab


 PRN Reason: Pain


metroNIDAZOLE [Flagyl] 500 mg PO Q8HR #42 tab


Discharge Medication List





Aspirin EC [Ecotrin Low Dose] 81 mg PO DAILY 11/17/16 [History]


Multivitamins, Thera [Multivitamin (formulary)] 1 tab PO DAILY 11/17/16 [History

]


Benzocaine/Menthol Lozeng [Cepacol lozenge] 1 each MUCOUS MEM Q1HR PRN #0 

lozenge 02/22/17 [Rx]


Cefuroxime Axetil [Ceftin] 500 mg PO BID #28 tab 02/22/17 [Rx]


Fluconazole [Diflucan] 100 mg PO HS #7 tab 02/22/17 [Rx]


HYDROcodone/APAP 5-325MG [[place] 5-325] 1 each PO Q4HR PRN #0 tab 02/22/17 [Rx]


Hydrocodone/Acetaminophen [Norco 5-325] 1 - 2 each PO Q6HR PRN #40 tab 02/22/17 

[Rx]


metroNIDAZOLE [Flagyl] 500 mg PO Q8HR #42 tab 02/22/17 [Rx]


Furosemide [Lasix] 40 mg PO DAILY #2 tablet 02/24/17 [Rx]


Labetalol [Trandate] 100 mg PO HS #90 tab 03/08/17 [Rx]








Follow up Appointment(s)/Referral(s): 


Kalli Roberson MD [STAFF PHYSICIAN] - 03/07/17 10:00 am


[facility], [NON-STAFF] - 


Ed Aparicio MD [STAFF PHYSICIAN] - 03/02/17 10:30 am


Patient Instructions/Handouts:  Vito-Caba Drain Care (DC), Bowel Resection (

DC)


Activity/Diet/Wound Care/Special Instructions: 


No lifting over 4 pounds in 4 weeks. 


Diet as tolerated. 


Notify surgeon of temperatures over 101.5, increased abdominal pain, redness of 

incisions.


Discharge Disposition: HOME WITH HOME HEALTH SERVICES

## 2017-02-23 NOTE — PN
DATE OF SERVICE:  02/22/2017



PRESENTING COMPLAINT: Abdominal surgery. 



INTERVAL HISTORY: I saw this patient this morning, continues to do 

better. Diet was advanced. Had a bowel movement. Overall feeling 

better. Pain is controlled. No nausea or vomiting.  



Review of systems done for constitutional, cardiovascular, GI, 

pulmonary; relevant findings as above.  



Current medications are reviewed. 



On examination, temperature 96.6, pulse 92, respiration 18, blood 

pressure 145/79, pulse ox 97% on room air.  

GENERAL APPEARANCE: Lying in bed, awake, comfortable. 

EYES:  Pupils equal. Conjunctivae normal. 

NECK: JVD not raised. Mass not palpable. 

RESPIRATORY: Effort normal. 

Lungs are clear. 

CARDIOVASCULAR: First and second sounds normal. No edema. 

ABDOMEN: Soft, nontender.  Liver and spleen not palpable. Mild (      

).  

PSYCHIATRY: Alert and oriented x3. Mood and affect normal. 



INVESTIGATIONS: White count 10.3, hemoglobin 10.9. Potassium 3.5. 



ASSESSMENT: 

1. Acute severe urinary tract infection in a patient who has had 

colovesical fistula with body fluid cultures growing Escherichia coli 

and Candida.  

2. Hypoalbuminemia likely acute phase reactant. 

3. Leukocytosis from underlying urinary tract infection, continues to 

improve.  

4. 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 

5. Essential hypertension. 

6. Chronic urinary incontinence. 

7. Status pot colorectal anastomosis and repair of colovesical fistula 

and laparoscopic sigmoid colectomy for complex perforated 

diverticulitis.  



PLAN: Patient continues to do well. Antibiotics per Dr. Aparicio.  

Patient should follow up with her family doctor upon discharge.  



Thank you Dr. Roberson.

## 2017-03-07 ENCOUNTER — HOSPITAL ENCOUNTER (OUTPATIENT)
Dept: HOSPITAL 47 - LABWHC1 | Age: 82
Discharge: HOME | End: 2017-03-07
Payer: MEDICARE

## 2017-03-07 DIAGNOSIS — K57.30: Primary | ICD-10-CM

## 2017-03-07 DIAGNOSIS — R00.0: ICD-10-CM

## 2017-03-07 LAB
ANION GAP SERPL CALC-SCNC: 11 MMOL/L
BUN SERPL-SCNC: 29 MG/DL (ref 7–17)
CALCIUM SPEC-MCNC: 9.5 MG/DL (ref 8.4–10.2)
CH: 31.6
CHCM: 32.7
CHLORIDE SERPL-SCNC: 98 MMOL/L (ref 98–107)
CO2 SERPL-SCNC: 28 MMOL/L (ref 22–30)
EKG: (no result)
ERYTHROCYTE [DISTWIDTH] IN BLOOD BY AUTOMATED COUNT: 4.52 M/UL (ref 3.8–5.4)
ERYTHROCYTE [DISTWIDTH] IN BLOOD: 14.1 % (ref 11.5–15.5)
GLUCOSE SERPL-MCNC: 128 MG/DL (ref 74–99)
HCT VFR BLD AUTO: 43.8 % (ref 34–46)
HDW: 2.52
HGB BLD-MCNC: 14.4 GM/DL (ref 11.4–16)
MCH RBC QN AUTO: 31.9 PG (ref 25–35)
MCHC RBC AUTO-ENTMCNC: 32.9 G/DL (ref 31–37)
MCV RBC AUTO: 97 FL (ref 80–100)
NON-AFRICAN AMERICAN GFR(MDRD): >60
POTASSIUM SERPL-SCNC: 4.9 MMOL/L (ref 3.5–5.1)
SODIUM SERPL-SCNC: 137 MMOL/L (ref 137–145)
WBC # BLD AUTO: 8.7 K/UL (ref 3.8–10.6)

## 2017-03-07 PROCEDURE — 80048 BASIC METABOLIC PNL TOTAL CA: CPT

## 2017-03-07 PROCEDURE — 36415 COLL VENOUS BLD VENIPUNCTURE: CPT

## 2017-03-07 PROCEDURE — 93005 ELECTROCARDIOGRAM TRACING: CPT

## 2017-03-07 PROCEDURE — 85027 COMPLETE CBC AUTOMATED: CPT

## 2018-01-22 ENCOUNTER — HOSPITAL ENCOUNTER (OUTPATIENT)
Dept: HOSPITAL 47 - RADNMMAIN | Age: 83
Discharge: HOME | End: 2018-01-22
Payer: MEDICARE

## 2018-01-22 DIAGNOSIS — R06.09: Primary | ICD-10-CM

## 2018-01-22 PROCEDURE — 93350 STRESS TTE ONLY: CPT

## 2018-01-22 PROCEDURE — 93017 CV STRESS TEST TRACING ONLY: CPT

## 2018-01-22 NOTE — ECHOS
STRESS ECHOCARDIOGRAM



DATE OF SERVICE:

01/22/2018



INDICATIONS:

Chest pain.



MEDICATIONS:

Furosemide, potassium, labetalol.



BASELINE HEART RATE:

113



BASELINE BLOOD PRESSURE:

152/98



MAXIMUM HEART RATE:

134



MAXIMUM BLOOD PRESSURE:

152/98



85% MPHR:

115



100% MPHR:

135



METS:

@@



MAXIMUM STAGE REACHED:

I



TOTAL EXERCISE TIME:

1:24



CLINICAL INFORMATION:



STRESS DATA:  Pretesting physical examination showed a heart rate of 113, pressure is

152/98 mmHg. Baseline EKG showed showed atrial fibrillation.  The patient exercised on

the treadmill for 1 minute and 24 seconds and achieved 1 of METs.  Max heart rate was

134 beats per minute which is about 100% of maximum predicted heart rate.  Maximum

blood pressure was 152/98 mmHg.  Clinically, she was asymptomatic and the EKG did not

show any significant changes.



ECHOCARDIOGRAM IMAGES: On echocardiogram images from parasternal long axis view,

parasternal short axis view, apical 4 chamber view, apical 2 chamber view were obtained

as the baseline images, at the peak of the heart rate, as well as on recovery.  The

echocardiogram images did not show any obvious wall motion abnormalities consistent

with ischemia.



CONCLUSION:

1. Poor exercise capacity.

2. Overall normal EKG in response to exercise.

3. Normal echocardiogram in response to exercise.





MMODL / IJN: [id number] / Job#: [id number]

## 2019-12-20 ENCOUNTER — HOSPITAL ENCOUNTER (OUTPATIENT)
Dept: HOSPITAL 47 - RADECHMAIN | Age: 84
Discharge: HOME | End: 2019-12-20
Attending: FAMILY MEDICINE
Payer: MEDICARE

## 2019-12-20 DIAGNOSIS — I27.20: ICD-10-CM

## 2019-12-20 DIAGNOSIS — I08.3: ICD-10-CM

## 2019-12-20 DIAGNOSIS — I48.91: Primary | ICD-10-CM

## 2019-12-20 PROCEDURE — 93306 TTE W/DOPPLER COMPLETE: CPT

## 2019-12-20 NOTE — ECHOF
Referral Reason:I48.91 atrial fibrillation



MEASUREMENTS

--------

HEIGHT: 160.0 cm

WEIGHT: 72.6 kg

BP: 

RVIDd:   3.3 cm     (< 3.3)

IVSd:   1.3 cm     (0.6 - 1.1)

LVIDd:   3.3 cm     (3.9 - 5.3)

LVPWd:   1.5 cm     (0.6 - 1.1)

IVSs:   1.5 cm

LVIDs:   3.2 cm

LVPWs:   1.2 cm

LA Diam:   4.0 cm     (2.7 - 3.8)

LAESV Index (A-L):   33.09 ml/m

Ao Diam:   2.8 cm     (2.0 - 3.7)

AV Cusp:   1.0 cm     (1.5 - 2.6)

LA Diam:   3.3 cm     (2.7 - 3.8)

MV EXCURSION:   22.169 mm     (> 18.000)

MV EF SLOPE:   90 mm/s     (70 - 150)

EPSS:   0.5 cm

MV E Ramon:   0.79 m/s

MV DecT:   141 ms

MV A Ramon:   0.01 m/s

MV E/A Ratio:   64.28 

RAP:   5.00 mmHg

RVSP:   49.53 mmHg

TAPSE:   17.57 mm







FINDINGS

--------

Atrial fibrillation.

This was a technically adequate study.

The left ventricular size is normal.   There is mild concentric left ventricular hypertrophy.   Overa
ll left ventricular systolic function is low-normal with, an EF between 50 - 55 %.   Left ventricular
 fillimg pressure cannot be estimated due to Atrial fibrillation.

The right ventricle is normal in size.

The left atrium is mildly dilated.   LA is midly dilated 29-33ml/m2.

There is mild aortic valve sclerosis.   There is no evidence of aortic regurgitation.

Mild mitral annular calcification present.   Mild mitral regurgitation is present.

Mild tricuspid regurgitation present.   There is moderate pulmonary hypertension.   The right ventric
ular systolic pressure, as measured by Doppler, is 49.53mmHg.

There is no pulmonic regurgitation present.

Echo free space indicative of a pericardial fat pad.



CONCLUSIONS

--------

1. Atrial fibrillation.

2. This was a technically adequate study.

3. The left ventricular size is normal.

4. There is mild concentric left ventricular hypertrophy.

5. Overall left ventricular systolic function is low-normal with, an EF between 50 - 55 %.

6. Left ventricular fillimg pressure cannot be estimated due to Atrial fibrillation.

7. The right ventricle is normal in size.

8. The left atrium is mildly dilated.

9. LA is midly dilated 29-33ml/m2.

10. There is mild aortic valve sclerosis.

11. Mild mitral annular calcification present.

12. Mild mitral regurgitation is present.

13. Mild tricuspid regurgitation present.

14. There is moderate pulmonary hypertension.

15. The right ventricular systolic pressure, as measured by Doppler, is 49.53mmHg.

16. There is no pulmonic regurgitation present.

17. Echo free space indicative of a pericardial fat pad.





SONOGRAPHER: Radha Lux RDCS

## 2020-09-16 ENCOUNTER — HOSPITAL ENCOUNTER (OUTPATIENT)
Dept: HOSPITAL 47 - RADECHMAIN | Age: 85
Discharge: HOME | End: 2020-09-16
Attending: FAMILY MEDICINE
Payer: MEDICARE

## 2020-09-16 DIAGNOSIS — I48.91: Primary | ICD-10-CM

## 2020-09-16 DIAGNOSIS — I08.3: ICD-10-CM

## 2020-09-16 DIAGNOSIS — I37.1: ICD-10-CM

## 2020-09-16 DIAGNOSIS — I27.20: ICD-10-CM

## 2020-09-16 PROCEDURE — 93306 TTE W/DOPPLER COMPLETE: CPT

## 2020-09-16 NOTE — ECHOF
Referral Reason:I50.32 CHF



MEASUREMENTS

--------

HEIGHT: 160.0 cm

WEIGHT: 65.8 kg

BP: 

RVIDd:   3.1 cm     (< 3.3)

IVSd:   1.3 cm     (0.6 - 1.1)

LVIDd:   4.2 cm     (3.9 - 5.3)

LVPWd:   1.2 cm     (0.6 - 1.1)

IVSs:   1.4 cm

LVIDs:   3.3 cm

LVPWs:   1.3 cm

LA Diam:   4.5 cm     (2.7 - 3.8)

LAESV Index (A-L):   32.79 ml/m

Ao Diam:   3.2 cm     (2.0 - 3.7)

AV Cusp:   1.2 cm     (1.5 - 2.6)

MV EXCURSION:   15.271 mm     (> 18.000)

MV EF SLOPE:   75 mm/s     (70 - 150)

EPSS:   0.9 cm

RAP:   5.00 mmHg

RVSP:   69.21 mmHg







FINDINGS

--------

Atrial fibrillation.

This was a technically good study.

LV size, wall thickness and systolic function are normal, with an EF greater than 55%.   The left charity
tricular size is normal.   Left ventricular fillimg pressure cannot be estimated due to Atrial fibril
lation.

The right ventricle is normal in size.

The left atrium is mildly dilated.   LA is midly dilated 29-33ml/m2.

The right atrial size is normal.

Trace to mild aortic regurgitation.

Mild mitral annular calcification present.   Mild mitral regurgitation is present.

Mild-to-moderate tricuspid regurgitation present.   There is moderate pulmonary hypertension.

Trace/mild (physiologic)  pulmonic regurgitation.

The aortic root size is normal.

There is no pericardial effusion.



CONCLUSIONS

--------

1. LV size, wall thickness and systolic function are normal, with an EF greater than 55%.

2. The left ventricular size is normal.

3. Left ventricular fillimg pressure cannot be estimated due to Atrial fibrillation.

4. The right ventricle is normal in size.

5. The left atrium is mildly dilated.

6. LA is midly dilated 29-33ml/m2.

7. The right atrial size is normal.

8. Trace to mild aortic regurgitation.

9. Mild mitral annular calcification present.

10. Mild mitral regurgitation is present.

11. Mild-to-moderate tricuspid regurgitation present.

12. There is moderate pulmonary hypertension.

13. Trace/mild (physiologic)  pulmonic regurgitation.

14. There is no pericardial effusion.





SONOGRAPHER: Radha Lux RDCS

## 2020-09-30 ENCOUNTER — HOSPITAL ENCOUNTER (OUTPATIENT)
Dept: HOSPITAL 47 - EC | Age: 85
Setting detail: OBSERVATION
LOS: 1 days | Discharge: HOME HEALTH | End: 2020-10-01
Attending: HOSPITALIST | Admitting: HOSPITALIST
Payer: MEDICARE

## 2020-09-30 DIAGNOSIS — J96.01: ICD-10-CM

## 2020-09-30 DIAGNOSIS — R06.02: Primary | ICD-10-CM

## 2020-09-30 DIAGNOSIS — Z87.891: ICD-10-CM

## 2020-09-30 DIAGNOSIS — M19.91: ICD-10-CM

## 2020-09-30 DIAGNOSIS — I07.1: ICD-10-CM

## 2020-09-30 DIAGNOSIS — K57.30: ICD-10-CM

## 2020-09-30 DIAGNOSIS — J44.9: ICD-10-CM

## 2020-09-30 DIAGNOSIS — I27.29: ICD-10-CM

## 2020-09-30 DIAGNOSIS — I48.19: ICD-10-CM

## 2020-09-30 DIAGNOSIS — J43.9: ICD-10-CM

## 2020-09-30 DIAGNOSIS — K76.1: ICD-10-CM

## 2020-09-30 DIAGNOSIS — I10: ICD-10-CM

## 2020-09-30 DIAGNOSIS — J84.10: ICD-10-CM

## 2020-09-30 DIAGNOSIS — Z79.01: ICD-10-CM

## 2020-09-30 DIAGNOSIS — I83.90: ICD-10-CM

## 2020-09-30 DIAGNOSIS — Z79.899: ICD-10-CM

## 2020-09-30 LAB
ALBUMIN SERPL-MCNC: 4.5 G/DL (ref 3.5–5)
ALP SERPL-CCNC: 143 U/L (ref 38–126)
ALT SERPL-CCNC: 24 U/L (ref 4–34)
ANION GAP SERPL CALC-SCNC: 8 MMOL/L
APTT BLD: 27.3 SEC (ref 22–30)
AST SERPL-CCNC: 61 U/L (ref 14–36)
BASOPHILS # BLD AUTO: 0.1 K/UL (ref 0–0.2)
BASOPHILS NFR BLD AUTO: 1 %
BUN SERPL-SCNC: 10 MG/DL (ref 7–17)
CALCIUM SPEC-MCNC: 9.4 MG/DL (ref 8.4–10.2)
CHLORIDE SERPL-SCNC: 101 MMOL/L (ref 98–107)
CO2 SERPL-SCNC: 27 MMOL/L (ref 22–30)
D DIMER PPP FEU-MCNC: 0.36 MG/L FEU (ref ?–0.6)
EOSINOPHIL # BLD AUTO: 0.1 K/UL (ref 0–0.7)
EOSINOPHIL NFR BLD AUTO: 1 %
ERYTHROCYTE [DISTWIDTH] IN BLOOD BY AUTOMATED COUNT: 4.23 M/UL (ref 3.8–5.4)
ERYTHROCYTE [DISTWIDTH] IN BLOOD: 13.3 % (ref 11.5–15.5)
GLUCOSE SERPL-MCNC: 126 MG/DL (ref 74–99)
HCT VFR BLD AUTO: 43.3 % (ref 34–46)
HGB BLD-MCNC: 14 GM/DL (ref 11.4–16)
INR PPP: 1.6 (ref ?–1.2)
LYMPHOCYTES # SPEC AUTO: 1.5 K/UL (ref 1–4.8)
LYMPHOCYTES NFR SPEC AUTO: 22 %
MCH RBC QN AUTO: 33.1 PG (ref 25–35)
MCHC RBC AUTO-ENTMCNC: 32.3 G/DL (ref 31–37)
MCV RBC AUTO: 102.4 FL (ref 80–100)
MONOCYTES # BLD AUTO: 0.3 K/UL (ref 0–1)
MONOCYTES NFR BLD AUTO: 5 %
NEUTROPHILS # BLD AUTO: 5 K/UL (ref 1.3–7.7)
NEUTROPHILS NFR BLD AUTO: 71 %
PLATELET # BLD AUTO: 168 K/UL (ref 150–450)
POTASSIUM SERPL-SCNC: 4.4 MMOL/L (ref 3.5–5.1)
PROT SERPL-MCNC: 7.7 G/DL (ref 6.3–8.2)
PT BLD: 15.4 SEC (ref 9–12)
SODIUM SERPL-SCNC: 136 MMOL/L (ref 137–145)
WBC # BLD AUTO: 7.1 K/UL (ref 3.8–10.6)

## 2020-09-30 PROCEDURE — 99285 EMERGENCY DEPT VISIT HI MDM: CPT

## 2020-09-30 PROCEDURE — 71250 CT THORAX DX C-: CPT

## 2020-09-30 PROCEDURE — 96374 THER/PROPH/DIAG INJ IV PUSH: CPT

## 2020-09-30 PROCEDURE — 85610 PROTHROMBIN TIME: CPT

## 2020-09-30 PROCEDURE — 80053 COMPREHEN METABOLIC PANEL: CPT

## 2020-09-30 PROCEDURE — 76705 ECHO EXAM OF ABDOMEN: CPT

## 2020-09-30 PROCEDURE — 83880 ASSAY OF NATRIURETIC PEPTIDE: CPT

## 2020-09-30 PROCEDURE — 83605 ASSAY OF LACTIC ACID: CPT

## 2020-09-30 PROCEDURE — 85379 FIBRIN DEGRADATION QUANT: CPT

## 2020-09-30 PROCEDURE — 85730 THROMBOPLASTIN TIME PARTIAL: CPT

## 2020-09-30 PROCEDURE — 94640 AIRWAY INHALATION TREATMENT: CPT

## 2020-09-30 PROCEDURE — 85025 COMPLETE CBC W/AUTO DIFF WBC: CPT

## 2020-09-30 PROCEDURE — 71046 X-RAY EXAM CHEST 2 VIEWS: CPT

## 2020-09-30 PROCEDURE — 36415 COLL VENOUS BLD VENIPUNCTURE: CPT

## 2020-09-30 PROCEDURE — 93005 ELECTROCARDIOGRAM TRACING: CPT

## 2020-09-30 PROCEDURE — 84484 ASSAY OF TROPONIN QUANT: CPT

## 2020-09-30 RX ADMIN — IPRATROPIUM BROMIDE AND ALBUTEROL SULFATE SCH ML: .5; 3 SOLUTION RESPIRATORY (INHALATION) at 19:09

## 2020-09-30 NOTE — XR
EXAMINATION TYPE: XR chest 2V

 

DATE OF EXAM: 9/30/2020

 

COMPARISON: Chest x-ray February 20, 2017. CTA chest February 22, 2016.

 

HISTORY: Difficulty breathing.

 

TECHNIQUE:  Frontal and lateral views of the chest are obtained.

 

FINDINGS:  There is chronic emphysematous and pulmonary fibrotic changes without suspicious new focal
 air space opacity, pleural effusion, or pneumothorax seen.  Stable elevated left hemidiaphragm. The 
cardiac silhouette size remains enlarged with atherosclerotic change aortic knob.   The osseous struc
tures remain demineralized. Underlying scoliosis redemonstrated.

 

IMPRESSION:  Chronic changes and cardiomegaly without new acute pulmonary process.

## 2020-09-30 NOTE — ED
SOB HPI





- General


Chief Complaint: Shortness of Breath


Stated Complaint: SOB


Time Seen by Provider: 09/30/20 14:00


Source: patient


Mode of arrival: ambulatory


Limitations: no limitations





- History of Present Illness


Initial Comments: 





88-year-old female with past history of A. fib on Coumadin who presents 

emergency room with reported shortness of breath.  Reports that her breathing 

has gone progressively worse over the past several months.  Shortness of breath 

is with exertion.  Denies any associated chest pain. Saw her PCP in office in 

regards to her symptoms and was sent for an echo last week.  She has had some 

lower extremity swelling.  She was placed on diuretics states the swelling has 

improved.  Denies a cough or hemoptysis.  No fevers or chills.  No sick contacts

with similar symptoms.  Denies history of DVT or PE.  No calf pain or swelling. 

Denies previous history of coronary artery disease.  No history of heart 

failure.  Denies that symptoms are worse when she lays flat.  Denies any 

abdominal pain.  No nausea, vomiting or diaphoresis.  No other alleviating, 

Perceptin or modifying factors





- Related Data


                                Home Medications











 Medication  Instructions  Recorded  Confirmed


 


Labetalol [Trandate] 100 mg PO DAILY 09/30/20 09/30/20


 


Potassium Chloride ER [K-Dur 20] 20 meq PO DAILY 09/30/20 09/30/20


 


Warfarin [Coumadin] 1 mg PO HS 09/30/20 09/30/20








                                  Previous Rx's











 Medication  Instructions  Recorded


 


Furosemide [Lasix] 40 mg PO DAILY #2 tablet 02/24/17


 


Albuterol Sulfate [Proair Hfa] 1 - 2 puff INHALATION Q4HR PRN #1 09/30/20





 inhaler 











                                    Allergies











Allergy/AdvReac Type Severity Reaction Status Date / Time


 


No Known Allergies Allergy   Verified 09/30/20 16:00














Review of Systems


ROS Statement: 


Those systems with pertinent positive or pertinent negative responses have been 

documented in the HPI.





ROS Other: All systems not noted in ROS Statement are negative.





Past Medical History


Past Medical History: Atrial Fibrillation, Hypertension, Osteoarthritis (OA)


Additional Past Medical History / Comment(s): varicose veins, diverticulitis,


History of Any Multi-Drug Resistant Organisms: None Reported


Past Surgical History: Tubal Ligation


Additional Past Surgical History / Comment(s): colonscopy


Past Anesthesia/Blood Transfusion Reactions: No Reported Reaction


Past Psychological History: No Psychological Hx Reported


Smoking Status: Never smoker


Past Alcohol Use History: Daily


Past Drug Use History: None Reported





- Past Family History


  ** Mother


Family Medical History: Cancer





  ** Daughter(s)


Family Medical History: Cancer





General Exam


Limitations: no limitations


General appearance: alert, in no apparent distress


Head exam: Present: atraumatic, normocephalic, normal inspection


Eye exam: Present: normal appearance, PERRL, EOMI.  Absent: scleral icterus, 

conjunctival injection, periorbital swelling


ENT exam: Present: normal exam, mucous membranes moist


Neck exam: Present: normal inspection.  Absent: tenderness, meningismus, 

lymphadenopathy


Respiratory exam: Present: accessory muscle use, decreased breath sounds, other 

(tachypnia upon ambulating. ).  Absent: respiratory distress, wheezes, rales, 

rhonchi, stridor


Cardiovascular Exam: Present: regular rate, normal rhythm, normal heart sounds. 

Absent: systolic murmur, diastolic murmur, rubs, gallop, clicks


GI/Abdominal exam: Present: soft, normal bowel sounds.  Absent: distended, 

tenderness, guarding, rebound, rigid


Extremities exam: Present: normal inspection, full ROM, normal capillary refill.

 Absent: tenderness, pedal edema, joint swelling, calf tenderness


Back exam: Present: normal inspection


Neurological exam: Present: alert, oriented X3, CN II-XII intact


Psychiatric exam: Present: normal affect, normal mood


Skin exam: Present: warm, dry, intact, normal color.  Absent: rash





Course


                                   Vital Signs











  09/30/20 09/30/20 09/30/20





  13:54 16:30 18:00


 


Temperature 97.5 F L 97.6 F 97.4 F L


 


Pulse Rate 78 77 72


 


Respiratory 16 16 18





Rate   


 


Blood Pressure 162/98 162/82 158/84


 


O2 Sat by Pulse 96 96 95





Oximetry   














Medical Decision Making





- Medical Decision Making





Upon arrival the patient is placed in room 5.  Thorough history and physical 

exam was performed.  Patient does have tachypnea upon arrival.  She is placed on

2 L of oxygen via nasal cannula with improved breathing.  Laboratory studies 

were conducted and the patient went for a chest x-ray.  Laboratory studies 

remarkable for a lactic acid of 2.1.  INR subtherapeutic at 1.6.  D-dimer 0.36. 

Troponin 0.033.  BNP 4740.  X-ray of her chest demonstrates chronic changes and 

cardiac mildly without new acute pulmonary process.  I discussed the results 

with the patient.  She is adamant that she wants to go home at this time and 

follow up with primary care doctor.  I recommended hospital admission for 

evaluation by pulmonology.  Patient refused knowing the risks.  She is 

requesting an inhaler for at home.  I did prescribe this medication.  States she

needs to follow-up with her doctor as soon as possible for primary function 

testing.  Patient got up to leave and began to ambulate.  She instantly became 

short of breath and pulse ox saturations dropped to 86%. Patient then changed 

her mind and agreed to stay.  I discussed case with Dr. Bautista who agreed to 

admission.  Pulmonology placed on consult.  Patient remained in stable condition

awaiting a bed on the floor





- Lab Data


Result diagrams: 


                                 10/01/20 08:41





                                 10/01/20 08:41


                                   Lab Results











  09/30/20 09/30/20 09/30/20 Range/Units





  14:48 14:48 14:48 


 


WBC  7.1    (3.8-10.6)  k/uL


 


RBC  4.23    (3.80-5.40)  m/uL


 


Hgb  14.0    (11.4-16.0)  gm/dL


 


Hct  43.3    (34.0-46.0)  %


 


MCV  102.4 H    (80.0-100.0)  fL


 


MCH  33.1    (25.0-35.0)  pg


 


MCHC  32.3    (31.0-37.0)  g/dL


 


RDW  13.3    (11.5-15.5)  %


 


Plt Count  168    (150-450)  k/uL


 


Neutrophils %  71    %


 


Lymphocytes %  22    %


 


Monocytes %  5    %


 


Eosinophils %  1    %


 


Basophils %  1    %


 


Neutrophils #  5.0    (1.3-7.7)  k/uL


 


Lymphocytes #  1.5    (1.0-4.8)  k/uL


 


Monocytes #  0.3    (0-1.0)  k/uL


 


Eosinophils #  0.1    (0-0.7)  k/uL


 


Basophils #  0.1    (0-0.2)  k/uL


 


Macrocytosis  Slight    


 


PT   15.4 H   (9.0-12.0)  sec


 


INR   1.6 H   (<1.2)  


 


APTT   27.3   (22.0-30.0)  sec


 


D-Dimer   0.36   (<0.60)  mg/L FEU


 


Sodium    136 L  (137-145)  mmol/L


 


Potassium    4.4  (3.5-5.1)  mmol/L


 


Chloride    101  ()  mmol/L


 


Carbon Dioxide    27  (22-30)  mmol/L


 


Anion Gap    8  mmol/L


 


BUN    10  (7-17)  mg/dL


 


Creatinine    0.64  (0.52-1.04)  mg/dL


 


Est GFR (CKD-EPI)AfAm    >90  (>60 ml/min/1.73 sqM)  


 


Est GFR (CKD-EPI)NonAf    80  (>60 ml/min/1.73 sqM)  


 


Glucose    126 H  (74-99)  mg/dL


 


Lactic Ac Sepsis Rflx     


 


Plasma Lactic Acid Ranjan     (0.7-2.0)  mmol/L


 


Calcium    9.4  (8.4-10.2)  mg/dL


 


Total Bilirubin    2.0 H  (0.2-1.3)  mg/dL


 


AST    61 H  (14-36)  U/L


 


ALT    24  (4-34)  U/L


 


Alkaline Phosphatase    143 H  ()  U/L


 


Troponin I     (0.000-0.034)  ng/mL


 


NT-Pro-B Natriuret Pep     pg/mL


 


Total Protein    7.7  (6.3-8.2)  g/dL


 


Albumin    4.5  (3.5-5.0)  g/dL














  09/30/20 09/30/20 09/30/20 Range/Units





  14:48 14:48 14:48 


 


WBC     (3.8-10.6)  k/uL


 


RBC     (3.80-5.40)  m/uL


 


Hgb     (11.4-16.0)  gm/dL


 


Hct     (34.0-46.0)  %


 


MCV     (80.0-100.0)  fL


 


MCH     (25.0-35.0)  pg


 


MCHC     (31.0-37.0)  g/dL


 


RDW     (11.5-15.5)  %


 


Plt Count     (150-450)  k/uL


 


Neutrophils %     %


 


Lymphocytes %     %


 


Monocytes %     %


 


Eosinophils %     %


 


Basophils %     %


 


Neutrophils #     (1.3-7.7)  k/uL


 


Lymphocytes #     (1.0-4.8)  k/uL


 


Monocytes #     (0-1.0)  k/uL


 


Eosinophils #     (0-0.7)  k/uL


 


Basophils #     (0-0.2)  k/uL


 


Macrocytosis     


 


PT     (9.0-12.0)  sec


 


INR     (<1.2)  


 


APTT     (22.0-30.0)  sec


 


D-Dimer     (<0.60)  mg/L FEU


 


Sodium     (137-145)  mmol/L


 


Potassium     (3.5-5.1)  mmol/L


 


Chloride     ()  mmol/L


 


Carbon Dioxide     (22-30)  mmol/L


 


Anion Gap     mmol/L


 


BUN     (7-17)  mg/dL


 


Creatinine     (0.52-1.04)  mg/dL


 


Est GFR (CKD-EPI)AfAm     (>60 ml/min/1.73 sqM)  


 


Est GFR (CKD-EPI)NonAf     (>60 ml/min/1.73 sqM)  


 


Glucose     (74-99)  mg/dL


 


Lactic Ac Sepsis Rflx     


 


Plasma Lactic Acid Ranjan  2.1 H*    (0.7-2.0)  mmol/L


 


Calcium     (8.4-10.2)  mg/dL


 


Total Bilirubin     (0.2-1.3)  mg/dL


 


AST     (14-36)  U/L


 


ALT     (4-34)  U/L


 


Alkaline Phosphatase     ()  U/L


 


Troponin I   0.033   (0.000-0.034)  ng/mL


 


NT-Pro-B Natriuret Pep    4740  pg/mL


 


Total Protein     (6.3-8.2)  g/dL


 


Albumin     (3.5-5.0)  g/dL














  09/30/20 Range/Units





  15:11 


 


WBC   (3.8-10.6)  k/uL


 


RBC   (3.80-5.40)  m/uL


 


Hgb   (11.4-16.0)  gm/dL


 


Hct   (34.0-46.0)  %


 


MCV   (80.0-100.0)  fL


 


MCH   (25.0-35.0)  pg


 


MCHC   (31.0-37.0)  g/dL


 


RDW   (11.5-15.5)  %


 


Plt Count   (150-450)  k/uL


 


Neutrophils %   %


 


Lymphocytes %   %


 


Monocytes %   %


 


Eosinophils %   %


 


Basophils %   %


 


Neutrophils #   (1.3-7.7)  k/uL


 


Lymphocytes #   (1.0-4.8)  k/uL


 


Monocytes #   (0-1.0)  k/uL


 


Eosinophils #   (0-0.7)  k/uL


 


Basophils #   (0-0.2)  k/uL


 


Macrocytosis   


 


PT   (9.0-12.0)  sec


 


INR   (<1.2)  


 


APTT   (22.0-30.0)  sec


 


D-Dimer   (<0.60)  mg/L FEU


 


Sodium   (137-145)  mmol/L


 


Potassium   (3.5-5.1)  mmol/L


 


Chloride   ()  mmol/L


 


Carbon Dioxide   (22-30)  mmol/L


 


Anion Gap   mmol/L


 


BUN   (7-17)  mg/dL


 


Creatinine   (0.52-1.04)  mg/dL


 


Est GFR (CKD-EPI)AfAm   (>60 ml/min/1.73 sqM)  


 


Est GFR (CKD-EPI)NonAf   (>60 ml/min/1.73 sqM)  


 


Glucose   (74-99)  mg/dL


 


Lactic Ac Sepsis Rflx  Y  


 


Plasma Lactic Acid Ranjan   (0.7-2.0)  mmol/L


 


Calcium   (8.4-10.2)  mg/dL


 


Total Bilirubin   (0.2-1.3)  mg/dL


 


AST   (14-36)  U/L


 


ALT   (4-34)  U/L


 


Alkaline Phosphatase   ()  U/L


 


Troponin I   (0.000-0.034)  ng/mL


 


NT-Pro-B Natriuret Pep   pg/mL


 


Total Protein   (6.3-8.2)  g/dL


 


Albumin   (3.5-5.0)  g/dL














- EKG Data


EKG Comments: 





EKG demonstrates A. fib with a rate of 82.  QRS 84.  QTC of 427.  No acute ST 

segment elevations or depressions





Disposition


Clinical Impression: 


 Acute respiratory insufficiency, Pulmonary fibrosis, Hypoxia





Disposition: ADMITTED AS IP TO THIS HOSP


Condition: Stable


Is patient prescribed a controlled substance at d/c from ED?: No


Time of Disposition: 16:08


Decision to Admit Reason: Admit from EC


Decision Date: 09/30/20


Decision Time: 16:41

## 2020-09-30 NOTE — CT
EXAMINATION TYPE: CT chest wo con

 

DATE OF EXAM: 9/30/2020

 

COMPARISON: 2/22/2016

 

HISTORY: Shortness of breath.

 

CT DLP: 584 mGycm

Automated exposure control for dose reduction was used.

 

Images were obtained prone and supine from the thoracic inlet to the diaphragm.

 

There is small right pleural effusion. There is no evidence of left pleural effusion. There are scatt
ered small subpleural nodular densities in the lungs. There is minimal subpleural interstitial densit
y in the lungs. There is no pulmonary consolidation. There is no bronchiectasis. There is no emphysem
atous change. There is no pericardial effusion. Heart is slightly enlarged. There is minimal focal pl
eural thickening on the lateral aspect of the right middle lobe. 

 

IMPRESSION:

There is a minimal peripheral reticular nodular interstitial pattern in the lungs consistent with mil
d pulmonary fibrosis. This is not significantly different than the CT scan of 2/22/2016. There is a s
mall right pleural effusion improved compared to old exam and could relate to some mild chronic heart
 failure.

## 2020-09-30 NOTE — P.HPIM
History of Present Illness


H&P Date: 09/30/20


Chief Complaint: Shortness of breath





Patient is an 88-year-old female with a significant past medical history of 

atrial fibrillation on Coumadin and hypertension who presents to the ED with 

shortness of breath that has been worsening over the past 2 months.  Patient 

states that last night she was not able to sleep due to the shortness of breath.

 This morning she went to go see her PCP.  After appointment she decided to then

come to the ED as she could barely walk 10 feet without getting short of breath.

 Patient denies any orthopnea.  She sleeps on one pillow and her bed is flat.  

Patient also denies PND.  Patient reports she has worsening lower extremity 

edema as well.  Patient denies any fever or chills or cough.  Patient denies any

sick contacts.  She states that she has been compliant with her mask.  Patient 

reports that she quit smoking 30 years ago and was not a heavy smoker.  However 

she states that her  was a heavy smoker and she has been exposed to a lot

of secondhand smoking.  In the ED patient was hypoxic at 86% on room air.  When 

she got up to use the bathroom she became significantly short of breath.  

Patient's breathing improved after she was put on 2 L nasal cannula.  Patient's 

chest x-ray showed emphysema and fibrotic changes.  Patient had a recent 

echocardiogram done earlier this month by her cardiologist which showed EF of 

55%, moderate pulmonary hypertension and mild to moderate tricuspid 

regurgitation.  Patient's labs were unremarkable except for mildly elevated 

LFTs.  Her INR was subtherapeutic at 1.6.  Patient blood pressure was in the 

170s.  She says that she feels a little anxious in the hospital.  She states 

that she has been compliant with all her medications.





Review of Systems





10 ROS reviewed and are negative except as noted in HPI





Past Medical History


Past Medical History: Atrial Fibrillation, Hypertension, Osteoarthritis (OA)


Additional Past Medical History / Comment(s): varicose veins, diverticulitis,


History of Any Multi-Drug Resistant Organisms: None Reported


Past Surgical History: Tubal Ligation


Additional Past Surgical History / Comment(s): colonscopy


Past Anesthesia/Blood Transfusion Reactions: No Reported Reaction


Past Psychological History: No Psychological Hx Reported


Smoking Status: Never smoker


Past Alcohol Use History: Daily


Past Drug Use History: None Reported





- Past Family History


  ** Mother


Family Medical History: Cancer





  ** Daughter(s)


Family Medical History: Cancer





Medications and Allergies


                                Home Medications











 Medication  Instructions  Recorded  Confirmed  Type


 


Furosemide [Lasix] 40 mg PO DAILY #2 tablet 02/24/17 09/30/20 Rx


 


Albuterol Sulfate [Proair Hfa] 1 - 2 puff INHALATION Q4HR PRN #1 09/30/20  Rx





 inhaler   


 


Labetalol [Trandate] 100 mg PO DAILY 09/30/20 09/30/20 History


 


Potassium Chloride ER [K-Dur 20] 20 meq PO DAILY 09/30/20 09/30/20 History


 


Warfarin [Coumadin] 1 mg PO HS 09/30/20 09/30/20 History








                                    Allergies











Allergy/AdvReac Type Severity Reaction Status Date / Time


 


No Known Allergies Allergy   Verified 09/30/20 16:00














Physical Exam


Osteopathic Statement: *.  No significant issues noted on an osteopathic 

structural exam other than those noted in the History and Physical/Consult.


Vitals: 


                                   Vital Signs











  Temp Pulse Resp BP Pulse Ox


 


 09/30/20 16:30  97.6 F  77  16  162/82  96


 


 09/30/20 13:54  97.5 F L  78  16  162/98  96








                                Intake and Output











 09/30/20 09/30/20 09/30/20





 06:59 14:59 22:59


 


Other:   


 


  Weight  63.503 kg 














General: [Alert and oriented, well nourished, no acute distress].


Eye: [PERRL, EOMI, normal conjunctiva].


HENT: [Normocephalic, clear tympanic membranes, normal hearing, moist oral 

mucosa, no scleral icterus, no sinus tenderness].


Neck: [Supple, non-tender, no carotid bruits, no JVD, no lymphadenopathy].


Lungs: [Diminished breath sounds bilaterally].


Heart: [Normal rate, regular rhythm, no murmur, gallop or edema].


Abdomen: [Soft, non-tender, non-distended, normal bowel sounds, no masses].


Musculoskeletal: [Normal range of motion and strength, no tenderness or 

swelling].


Skin: [Skin is warm, dry and pink, no rashes or lesions].


Neurologic: [Awake, alert, and oriented X3, CN II-XII intact].


Psychiatric: [Anxious].





Results


CBC & Chem 7: 


                                 09/30/20 14:48





                                 09/30/20 14:48


Labs: 


                  Abnormal Lab Results - Last 24 Hours (Table)











  09/30/20 09/30/20 09/30/20 Range/Units





  14:48 14:48 14:48 


 


MCV  102.4 H    (80.0-100.0)  fL


 


PT   15.4 H   (9.0-12.0)  sec


 


INR   1.6 H   (<1.2)  


 


Sodium    136 L  (137-145)  mmol/L


 


Glucose    126 H  (74-99)  mg/dL


 


Plasma Lactic Acid Ranjan     (0.7-2.0)  mmol/L


 


Total Bilirubin    2.0 H  (0.2-1.3)  mg/dL


 


AST    61 H  (14-36)  U/L


 


Alkaline Phosphatase    143 H  ()  U/L














  09/30/20 Range/Units





  14:48 


 


MCV   (80.0-100.0)  fL


 


PT   (9.0-12.0)  sec


 


INR   (<1.2)  


 


Sodium   (137-145)  mmol/L


 


Glucose   (74-99)  mg/dL


 


Plasma Lactic Acid Ranjan  2.1 H*  (0.7-2.0)  mmol/L


 


Total Bilirubin   (0.2-1.3)  mg/dL


 


AST   (14-36)  U/L


 


Alkaline Phosphatase   ()  U/L














Assessment and Plan


Assessment: 





#Shortness of breath likely due to worsening pulmonary hypertension and 

underlying COPD and interstitial lung disease: Chest x-ray shows emphysema and 

fibrosis with no acute process.  We'll start the patient on IV steroids and 

breathing treatments.  Consult pulmonology.  We will obtain a high rise computed

 tomography scan





#Moderate pulmonary hypertension likely due to COPD and fibrosis: Resume Lasix 

for lower extremity edema.  Management as above





#Acute hypoxic respiratory failure: Patient will likely need oxygen on discharg

e.  She will need home O2 evaluation prior to discharge.





#Mildly elevated LFTs likely due to hepatic congestion due to the above: Check 

right upper quadrant ultrasound





#Atrial fibrillation: Heart rate is controlled.  Resume labetalol and pharmacy 

to dose Coumadin





CODE STATUS:full code


DVT prophylaxis: Coumadin


Discussed with: Patient, ER, rn


Anticipated length of stay  < than 2 midnights


Anticipated discharge place: home


A total of 75 minutes was spent on the care of this complex patient more than 

50% of the time was spent in counseling and care coordination.

## 2020-10-01 VITALS — RESPIRATION RATE: 19 BRPM | SYSTOLIC BLOOD PRESSURE: 147 MMHG | TEMPERATURE: 97.3 F | DIASTOLIC BLOOD PRESSURE: 91 MMHG

## 2020-10-01 VITALS — HEART RATE: 82 BPM

## 2020-10-01 LAB
ALBUMIN SERPL-MCNC: 4 G/DL (ref 3.5–5)
ALP SERPL-CCNC: 126 U/L (ref 38–126)
ALT SERPL-CCNC: 24 U/L (ref 4–34)
ANION GAP SERPL CALC-SCNC: 4 MMOL/L
AST SERPL-CCNC: 51 U/L (ref 14–36)
BASOPHILS # BLD AUTO: 0 K/UL (ref 0–0.2)
BASOPHILS NFR BLD AUTO: 0 %
BUN SERPL-SCNC: 10 MG/DL (ref 7–17)
CALCIUM SPEC-MCNC: 9.1 MG/DL (ref 8.4–10.2)
CHLORIDE SERPL-SCNC: 103 MMOL/L (ref 98–107)
CO2 SERPL-SCNC: 29 MMOL/L (ref 22–30)
EOSINOPHIL # BLD AUTO: 0 K/UL (ref 0–0.7)
EOSINOPHIL NFR BLD AUTO: 0 %
ERYTHROCYTE [DISTWIDTH] IN BLOOD BY AUTOMATED COUNT: 3.98 M/UL (ref 3.8–5.4)
ERYTHROCYTE [DISTWIDTH] IN BLOOD: 13.8 % (ref 11.5–15.5)
GLUCOSE SERPL-MCNC: 169 MG/DL (ref 74–99)
HCT VFR BLD AUTO: 40.9 % (ref 34–46)
HGB BLD-MCNC: 13.1 GM/DL (ref 11.4–16)
INR PPP: 1.4 (ref ?–1.2)
LYMPHOCYTES # SPEC AUTO: 0.6 K/UL (ref 1–4.8)
LYMPHOCYTES NFR SPEC AUTO: 19 %
MCH RBC QN AUTO: 32.9 PG (ref 25–35)
MCHC RBC AUTO-ENTMCNC: 32 G/DL (ref 31–37)
MCV RBC AUTO: 102.7 FL (ref 80–100)
MONOCYTES # BLD AUTO: 0 K/UL (ref 0–1)
MONOCYTES NFR BLD AUTO: 1 %
NEUTROPHILS # BLD AUTO: 2.3 K/UL (ref 1.3–7.7)
NEUTROPHILS NFR BLD AUTO: 79 %
PLATELET # BLD AUTO: 172 K/UL (ref 150–450)
POTASSIUM SERPL-SCNC: 4.9 MMOL/L (ref 3.5–5.1)
PROT SERPL-MCNC: 7 G/DL (ref 6.3–8.2)
PT BLD: 14.3 SEC (ref 9–12)
SODIUM SERPL-SCNC: 136 MMOL/L (ref 137–145)
WBC # BLD AUTO: 2.9 K/UL (ref 3.8–10.6)

## 2020-10-01 RX ADMIN — IPRATROPIUM BROMIDE AND ALBUTEROL SULFATE SCH ML: .5; 3 SOLUTION RESPIRATORY (INHALATION) at 11:31

## 2020-10-01 RX ADMIN — IPRATROPIUM BROMIDE AND ALBUTEROL SULFATE SCH ML: .5; 3 SOLUTION RESPIRATORY (INHALATION) at 08:00

## 2020-10-01 NOTE — US
EXAMINATION TYPE: US liver

 

DATE OF EXAM: 10/1/2020

 

COMPARISON: NONE

 

CLINICAL HISTORY: Elevated LFTs. abn labs

 

EXAM MEASUREMENTS:

 

Liver Length:  14.9 cm   

Gallbladder Wall:  0.5 cm   

CBD:  0.5 cm

Right Kidney:  8.4 x 3.4 x 4.0 cm

 

 

 

Pancreas:  Obscured by bowel gas

Liver:  wnl  

Gallbladder:  1.8cm stone toward neck , nonmobile, wall thickening 

**Evidence for sonographic Durham's sign:  no

CBD:  wnl 

Right Kidney:  wnl 

 

 

 

IMPRESSION: 

1. There is wall thickening of the gallbladder with a 1.8 cm stone within the neck of the gallbladder
. Correlate for cholecystitis. HIDA scan could BE obtained as clinically warranted

## 2020-10-01 NOTE — CONS
CONSULTATION



PULMONARY/CRITICAL CARE CONSULTATION:



DATE OF SERVICE:

10/01/2020



This is an 88-year-old female who sees Dr. Becerra as a primary.  She states that for

about a year now, maybe a bit longer, she has noticed increasing shortness of breath.

She apparently has mentioned this to his her physician and apparently it may  have been

blamed on her history of chronic atrial fibrillation.  Nonetheless, the patient states

that she is not short of breath at rest, only on exertion.  She also admits to a dry

nonproductive cough.  Again, this has been going on for some time.  She denies any

chest pain or chest discomfort.  She apparently was walking in the hallway and her

saturations on room air went down to 86%.  For that reason, she was sent to the

emergency room and she was evaluated and admitted.  She had a chest x-ray which

appeared relatively normal.  A CT scan of the chest reveals some basilar fibrosis which

was mild but new compared to a prior CT scan.  I suspect her shortness of breath is

maybe multifactorial, in part related to age and deconditioning, also atrial

fibrillation, and finally pulmonary fibrosis.  Nonetheless, from the pulmonary

standpoint, the patient could be discharged home.  She may need to be discharged home

on oxygen therapy.  She does need a thorough outpatient evaluation with a six minute

walk distance and a complete pulmonary function test.



HOME MEDICATIONS:

Include labetalol, potassium, Coumadin, Lasix, and albuterol inhaler.



ALLERGIES:

Denied.



MEDICAL HISTORY:

Medical history includes atrial fibrillation, hypertension, DJD, varicose veins and

diverticular disease.



SURGICAL HISTORY:

Includes tubal ligation and colonoscopy.



SOCIAL HISTORY:

Positive for remote minimal tobacco use when she was young.  She admits to daily

alcohol use but denies any illicit drug use.



FAMILY HISTORY:

Positive for mother and a daughter with cancer.



REVIEW OF SYSTEMS:

CONSTITUTIONAL:  Negative.

NEUROLOGIC:  Negative.

HEENT:  Negative.

CARDIOVASCULAR:  Negative.

PULMONARY:  Shortness of breath on exertion and dry nonproductive cough.

GI/:  Negative.

RHEUMATOLOGIC:  Negative.

IMMUNOLOGIC:  Negative.

ENDOCRINOLOGIC:  Negative.

DERMATOLOGIC:  Negative.



PHYSICAL EXAMINATION:

VITAL SIGNS: Current vital signs are reviewed.  Temperature 98, heart rate 84,

respiratory rate 22, blood pressure 154/84, mean 107, 2 L saturation 97%.  Appears in

no acute distress.

HEENT: Examination is grossly unremarkable.

NECK:  Supple. Full range of motion.  No adenopathy.  Neck veins are flat.

CARDIOVASCULAR: Examination reveals a regular rhythm rate.  She is clearly in atrial

fibrillation.  I do not hear a distinct murmur.  Heart rate in the mid 80s.

LUNGS:  Reveal some bibasilar Velcro crackles.  She is minimally restricted in her

breathing.  No rhonchi or wheezes.

ABDOMEN:  Soft. Bowel sounds are heard.

EXTREMITIES are intact.  Minimal edema.

SKIN: Without rash.

NEUROLOGIC: Examination is brief but nonfocal.



LABS:

Reviewed.  White count 7.1, hemoglobin 14, hematocrit 43.3, platelet count 168,000.  PT

15.4, INR 1.6, PTT 27.3.  D-dimer 0.36.  Sodium 136, potassium 4.4, chloride 101, CO2

27, anion gap 8. BUN and creatinine were 10 and 0.64.  The rest of the labs look okay.



Microbiology is negative.



Chest x-ray is reviewed.  It appears to be relatively normal.



CT scan was reviewed.  It shows some minimal basilar fibrotic changes.



Medications are reviewed.  She is on Lasix, Apresoline, DuoNeb, labetalol, Solu-Medrol,

Narcan, potassium chloride, and warfarin.  The Solu-Medrol can be discontinued as can

the  updraft.



ASSESSMENT:

1. 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

    fibrosis, chronic atrial fibrillation, and deconditioning.

2. History of chronic atrial fibrillation.

3. Remote minimal tobacco history, doubt significant chronic obstructive pulmonary

    disease.

4. History of hypertension.

5. History of degenerative joint disease.

6. History of varicose veins.

7. History of diverticular disease.



PLAN:

The patient could be discharged home.  She should be assessed for oxygen use at home

prior to discharge.  She needs a resting room air saturation and a saturation while

walking up and down the hallway.  She needs follow up with me in a couple weeks.  She

will need a complete pulmonary function test including lung volumes and diffusion and

she will also need a 6 minute walk distance.  Additional recommendations and

suggestions are forthcoming.  Her diagnosis was explained to her.  I did mention to the

nurse that the patient could be discharged.





MMODL / IJN: [id number] / Job#: [id number]

## 2020-10-03 NOTE — P.DS
Providers


Date of admission: 


09/30/20 16:41





Expected date of discharge: 10/01/20


Attending physician: 


Rico Akbar





Consults: 





                                        





09/30/20 16:42


Consult Physician Urgent 


   Consulting Provider: Shaquille Mccollum


   Consult Reason/Comments: acute new onset hypoxic respiratory failure, 

pulmonary fibrosis


   Do you want consulting provider notified?: Yes











Primary care physician: 


Joe Becerra





[facility] Course: 





Addendum:


I called the patient at home.  Regarding results of ultrasound with gallstone.  

She remains to have no abdominal pain.  She is due to see her family doctor on 

Monday.  Told her to to get that refilled for a surgeon.  She continues to have 

no abdominal pain or tenderness, at this point nothing different to be done.


Patient Condition at Discharge: Stable





Plan - Discharge Summary


Discharge Rx Participant: No


New Discharge Prescriptions: 


New


   Albuterol Sulfate [Proair Hfa] 1 - 2 puff INHALATION Q4HR PRN #1 inhaler


     PRN Reason: difficulty in breathing





Continue


   Furosemide [Lasix] 40 mg PO DAILY #2 tablet


   Labetalol [Trandate] 100 mg PO DAILY


   Warfarin [Coumadin] 1 mg PO HS


   Potassium Chloride ER [K-Dur 20] 20 meq PO DAILY


Discharge Medication List





Furosemide [Lasix] 40 mg PO DAILY #2 tablet 02/24/17 [Rx]


Albuterol Sulfate [Proair Hfa] 1 - 2 puff INHALATION Q4HR PRN #1 inhaler 

09/30/20 [Rx]


Labetalol [Trandate] 100 mg PO DAILY 09/30/20 [History]


Potassium Chloride ER [K-Dur 20] 20 meq PO DAILY 09/30/20 [History]


Warfarin [Coumadin] 1 mg PO HS 09/30/20 [History]








Follow up Appointment(s)/Referral(s): 


Shaquille Mccollum DO [Doctor of Osteopathic Medicine] - 10/13/20 9:30 am


[facility], [NON-STAFF] - 1-2 Days


Joe Becerra MD [Primary Care Provider] - 10/05/20 10:00 am (appointment 

with Aziza WANG)


Patient Instructions/Handouts:  Shortness of Breath (ED)


Activity/Diet/Wound Care/Special Instructions: 


Follow up with your doctor for your low INR. 


Return to the emergency room should you agree to have further treatment or have 

new or worsening symptoms


activity as tolerated


heart healthy diet as tolerated 


Discharge Disposition: HOME WITH HOME HEALTH SERVICES

## 2020-10-03 NOTE — DS
DISCHARGE SUMMARY



DATE OF ADMISSION:

09/30/2020



DATE OF DISCHARGE:

10/01/2020.



FINAL DIAGNOSES:

1. Shortness of breath, possibly from underlying pulmonary fibrosis and other factors

    contributing.

2. 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

    pulmonary fibrosis.

3. Pulmonary fibrosis.

4. Persistent atrial fibrillation.

5. Essential hypertension.

6. Primary osteoarthritis.

7. Colonic diverticulosis.

8. Varicose veins.



HOSPITAL COURSE:

This patient presented with shortness of breath.  CT scan of the chest showed some

peripheral reticular nodular interstitial pattern suggestive of mild pulmonary

fibrosis.  It was not significantly different from 2016.  The patient felt to have some

element of COPD.  Given bronchodilators.  Responded well.  Doing much better.  On the

day of discharge, the patient's pulse ox was checked with ambulation was only 89% above

the cutoff of 88.  The patient is feeling better.  Care was discussed at length with

the patient.  Cleared by Dr. Mccollum from Pulmonary.



Consultation Dr. Mccollum.



On examination, temperature 97.3, pulse 80, blood pressure 147/91, pulse ox 89 percent

with ambulation.

LUNGS: Slightly decreased breath sounds.  Cardiovascular:  1st and 2nd sounds normal.

Psych: AO x3. Mood and affect normal.



INVESTIGATIONS:

White count 2.9, hemoglobin 13.1, platelets 122.  Potassium 4.9, creatinine 0.61.



HOME GO MEDICATIONS:

1. Lasix 40 mg a day.

2. ProAir 1-2 puffs q.4 p.r.n.

3. Labetalol 100 mg daily.

4. Potassium 20 mEq daily.

5. Coumadin 1 mg q.h.s.

The patient to follow up with the family doctor for INR check.



FOLLOWUP:

Follow up with Dr. Becerra on 10/05/2020, follow up with Dr. Shaquille Mccollum on

10/13/2020.



ADDENDUM:

Liver ultrasound does showed a 1.8 cm stone in the gallbladder  with some wall

thickening.  The patient does not have any abdominal tenderness.  To follow up as an

outpatient.  We will call the patient, inform her of the same.  Copy to Dr. Hernan PRINCE / SAMANTHA: [id number] / Job#: [id number]